# Patient Record
Sex: FEMALE | Race: WHITE | NOT HISPANIC OR LATINO | URBAN - METROPOLITAN AREA
[De-identification: names, ages, dates, MRNs, and addresses within clinical notes are randomized per-mention and may not be internally consistent; named-entity substitution may affect disease eponyms.]

---

## 2018-01-08 ENCOUNTER — EMERGENCY (EMERGENCY)
Facility: HOSPITAL | Age: 83
LOS: 1 days | Discharge: OTHER INSTITUTION | End: 2018-01-08
Attending: EMERGENCY MEDICINE | Admitting: EMERGENCY MEDICINE
Payer: SELF-PAY

## 2018-01-08 ENCOUNTER — TRANSCRIPTION ENCOUNTER (OUTPATIENT)
Age: 83
End: 2018-01-08

## 2018-01-08 ENCOUNTER — INPATIENT (INPATIENT)
Facility: HOSPITAL | Age: 83
LOS: 2 days | Discharge: HOME | DRG: 65 | End: 2018-01-11
Attending: SPECIALIST | Admitting: SPECIALIST
Payer: MEDICARE

## 2018-01-08 VITALS
HEART RATE: 70 BPM | OXYGEN SATURATION: 96 % | DIASTOLIC BLOOD PRESSURE: 85 MMHG | SYSTOLIC BLOOD PRESSURE: 184 MMHG | RESPIRATION RATE: 16 BRPM | WEIGHT: 104.72 LBS

## 2018-01-08 VITALS
SYSTOLIC BLOOD PRESSURE: 155 MMHG | OXYGEN SATURATION: 98 % | HEART RATE: 80 BPM | RESPIRATION RATE: 16 BRPM | DIASTOLIC BLOOD PRESSURE: 78 MMHG

## 2018-01-08 VITALS
DIASTOLIC BLOOD PRESSURE: 90 MMHG | RESPIRATION RATE: 18 BRPM | SYSTOLIC BLOOD PRESSURE: 164 MMHG | HEART RATE: 70 BPM | TEMPERATURE: 98 F | OXYGEN SATURATION: 99 %

## 2018-01-08 DIAGNOSIS — I63.512 CEREBRAL INFARCTION DUE TO UNSPECIFIED OCCLUSION OR STENOSIS OF LEFT MIDDLE CEREBRAL ARTERY: ICD-10-CM

## 2018-01-08 LAB
ALBUMIN SERPL ELPH-MCNC: 4.2 G/DL — SIGNIFICANT CHANGE UP (ref 3.3–5.2)
ALP SERPL-CCNC: 116 U/L — SIGNIFICANT CHANGE UP (ref 40–120)
ALT FLD-CCNC: 12 U/L — SIGNIFICANT CHANGE UP
ANION GAP SERPL CALC-SCNC: 14 MMOL/L — SIGNIFICANT CHANGE UP (ref 5–17)
ANION GAP SERPL CALC-SCNC: 15 MMOL/L — SIGNIFICANT CHANGE UP (ref 5–17)
APTT BLD: 21.1 SEC — LOW (ref 27.5–37.4)
APTT BLD: 26.2 SEC — LOW (ref 27.5–37.4)
AST SERPL-CCNC: 27 U/L — SIGNIFICANT CHANGE UP
BASOPHILS # BLD AUTO: 0 K/UL — SIGNIFICANT CHANGE UP (ref 0–0.2)
BASOPHILS NFR BLD AUTO: 0.2 % — SIGNIFICANT CHANGE UP (ref 0–2)
BILIRUB SERPL-MCNC: 0.6 MG/DL — SIGNIFICANT CHANGE UP (ref 0.4–2)
BLD GP AB SCN SERPL QL: NEGATIVE — SIGNIFICANT CHANGE UP
BLD GP AB SCN SERPL QL: SIGNIFICANT CHANGE UP
BUN SERPL-MCNC: 22 MG/DL — SIGNIFICANT CHANGE UP (ref 7–23)
BUN SERPL-MCNC: 25 MG/DL — HIGH (ref 8–20)
CALCIUM SERPL-MCNC: 10.1 MG/DL — SIGNIFICANT CHANGE UP (ref 8.6–10.2)
CALCIUM SERPL-MCNC: 9.6 MG/DL — SIGNIFICANT CHANGE UP (ref 8.4–10.5)
CHLORIDE SERPL-SCNC: 102 MMOL/L — SIGNIFICANT CHANGE UP (ref 98–107)
CHLORIDE SERPL-SCNC: 99 MMOL/L — SIGNIFICANT CHANGE UP (ref 96–108)
CO2 SERPL-SCNC: 23 MMOL/L — SIGNIFICANT CHANGE UP (ref 22–31)
CO2 SERPL-SCNC: 25 MMOL/L — SIGNIFICANT CHANGE UP (ref 22–29)
CREAT SERPL-MCNC: 0.95 MG/DL — SIGNIFICANT CHANGE UP (ref 0.5–1.3)
CREAT SERPL-MCNC: 0.98 MG/DL — SIGNIFICANT CHANGE UP (ref 0.5–1.3)
EOSINOPHIL # BLD AUTO: 0.1 K/UL — SIGNIFICANT CHANGE UP (ref 0–0.5)
EOSINOPHIL NFR BLD AUTO: 1 % — SIGNIFICANT CHANGE UP (ref 0–6)
GLUCOSE SERPL-MCNC: 123 MG/DL — HIGH (ref 70–99)
GLUCOSE SERPL-MCNC: 153 MG/DL — HIGH (ref 70–115)
HBA1C BLD-MCNC: 5.7 % — HIGH (ref 4–5.6)
HCT VFR BLD CALC: 34.1 % — LOW (ref 34.5–45)
HCT VFR BLD CALC: 35.4 % — LOW (ref 37–47)
HGB BLD-MCNC: 11.5 G/DL — LOW (ref 12–16)
HGB BLD-MCNC: 11.6 G/DL — SIGNIFICANT CHANGE UP (ref 11.5–15.5)
INR BLD: 1.01 RATIO — SIGNIFICANT CHANGE UP (ref 0.88–1.16)
INR BLD: 1.07 RATIO — SIGNIFICANT CHANGE UP (ref 0.88–1.16)
LYMPHOCYTES # BLD AUTO: 4.3 K/UL — SIGNIFICANT CHANGE UP (ref 1–4.8)
LYMPHOCYTES # BLD AUTO: 45.9 % — SIGNIFICANT CHANGE UP (ref 20–55)
MCHC RBC-ENTMCNC: 29.5 PG — SIGNIFICANT CHANGE UP (ref 27–31)
MCHC RBC-ENTMCNC: 31.4 PG — SIGNIFICANT CHANGE UP (ref 27–34)
MCHC RBC-ENTMCNC: 32.5 G/DL — SIGNIFICANT CHANGE UP (ref 32–36)
MCHC RBC-ENTMCNC: 34.1 GM/DL — SIGNIFICANT CHANGE UP (ref 32–36)
MCV RBC AUTO: 90.8 FL — SIGNIFICANT CHANGE UP (ref 81–99)
MCV RBC AUTO: 92.3 FL — SIGNIFICANT CHANGE UP (ref 80–100)
MONOCYTES # BLD AUTO: 0.6 K/UL — SIGNIFICANT CHANGE UP (ref 0–0.8)
MONOCYTES NFR BLD AUTO: 6.2 % — SIGNIFICANT CHANGE UP (ref 3–10)
NEUTROPHILS # BLD AUTO: 4.3 K/UL — SIGNIFICANT CHANGE UP (ref 1.8–8)
NEUTROPHILS NFR BLD AUTO: 46.3 % — SIGNIFICANT CHANGE UP (ref 37–73)
PLATELET # BLD AUTO: 265 K/UL — SIGNIFICANT CHANGE UP (ref 150–400)
PLATELET # BLD AUTO: 284 K/UL — SIGNIFICANT CHANGE UP (ref 150–400)
POTASSIUM SERPL-MCNC: 4.2 MMOL/L — SIGNIFICANT CHANGE UP (ref 3.5–5.3)
POTASSIUM SERPL-MCNC: 4.4 MMOL/L — SIGNIFICANT CHANGE UP (ref 3.5–5.3)
POTASSIUM SERPL-SCNC: 4.2 MMOL/L — SIGNIFICANT CHANGE UP (ref 3.5–5.3)
POTASSIUM SERPL-SCNC: 4.4 MMOL/L — SIGNIFICANT CHANGE UP (ref 3.5–5.3)
PROT SERPL-MCNC: 8.1 G/DL — SIGNIFICANT CHANGE UP (ref 6.6–8.7)
PROTHROM AB SERPL-ACNC: 11.1 SEC — SIGNIFICANT CHANGE UP (ref 9.8–12.7)
PROTHROM AB SERPL-ACNC: 11.7 SEC — SIGNIFICANT CHANGE UP (ref 9.8–12.7)
RBC # BLD: 3.69 M/UL — LOW (ref 3.8–5.2)
RBC # BLD: 3.9 M/UL — LOW (ref 4.4–5.2)
RBC # FLD: 13.3 % — SIGNIFICANT CHANGE UP (ref 10.3–14.5)
RBC # FLD: 14.5 % — SIGNIFICANT CHANGE UP (ref 11–15.6)
RH IG SCN BLD-IMP: POSITIVE — SIGNIFICANT CHANGE UP
SODIUM SERPL-SCNC: 136 MMOL/L — SIGNIFICANT CHANGE UP (ref 135–145)
SODIUM SERPL-SCNC: 142 MMOL/L — SIGNIFICANT CHANGE UP (ref 135–145)
TROPONIN T SERPL-MCNC: <0.01 NG/ML — SIGNIFICANT CHANGE UP (ref 0–0.06)
TYPE + AB SCN PNL BLD: SIGNIFICANT CHANGE UP
WBC # BLD: 18.1 K/UL — HIGH (ref 3.8–10.5)
WBC # BLD: 9.3 K/UL — SIGNIFICANT CHANGE UP (ref 4.8–10.8)
WBC # FLD AUTO: 18.1 K/UL — HIGH (ref 3.8–10.5)
WBC # FLD AUTO: 9.3 K/UL — SIGNIFICANT CHANGE UP (ref 4.8–10.8)

## 2018-01-08 PROCEDURE — 86850 RBC ANTIBODY SCREEN: CPT

## 2018-01-08 PROCEDURE — 70498 CT ANGIOGRAPHY NECK: CPT | Mod: 26

## 2018-01-08 PROCEDURE — 99285 EMERGENCY DEPT VISIT HI MDM: CPT | Mod: 25

## 2018-01-08 PROCEDURE — 70450 CT HEAD/BRAIN W/O DYE: CPT | Mod: 26,59

## 2018-01-08 PROCEDURE — 84484 ASSAY OF TROPONIN QUANT: CPT

## 2018-01-08 PROCEDURE — 86900 BLOOD TYPING SEROLOGIC ABO: CPT

## 2018-01-08 PROCEDURE — 82962 GLUCOSE BLOOD TEST: CPT

## 2018-01-08 PROCEDURE — 36415 COLL VENOUS BLD VENIPUNCTURE: CPT

## 2018-01-08 PROCEDURE — 93010 ELECTROCARDIOGRAM REPORT: CPT

## 2018-01-08 PROCEDURE — 99291 CRITICAL CARE FIRST HOUR: CPT

## 2018-01-08 PROCEDURE — 72170 X-RAY EXAM OF PELVIS: CPT | Mod: 26

## 2018-01-08 PROCEDURE — 72170 X-RAY EXAM OF PELVIS: CPT

## 2018-01-08 PROCEDURE — 85610 PROTHROMBIN TIME: CPT

## 2018-01-08 PROCEDURE — 72125 CT NECK SPINE W/O DYE: CPT

## 2018-01-08 PROCEDURE — 71045 X-RAY EXAM CHEST 1 VIEW: CPT | Mod: 26

## 2018-01-08 PROCEDURE — 99291 CRITICAL CARE FIRST HOUR: CPT | Mod: 25

## 2018-01-08 PROCEDURE — 71045 X-RAY EXAM CHEST 1 VIEW: CPT

## 2018-01-08 PROCEDURE — 80053 COMPREHEN METABOLIC PANEL: CPT

## 2018-01-08 PROCEDURE — 70450 CT HEAD/BRAIN W/O DYE: CPT | Mod: 26

## 2018-01-08 PROCEDURE — 70496 CT ANGIOGRAPHY HEAD: CPT | Mod: 26

## 2018-01-08 PROCEDURE — 86901 BLOOD TYPING SEROLOGIC RH(D): CPT

## 2018-01-08 PROCEDURE — 70450 CT HEAD/BRAIN W/O DYE: CPT

## 2018-01-08 PROCEDURE — 85027 COMPLETE CBC AUTOMATED: CPT

## 2018-01-08 PROCEDURE — G0426: CPT

## 2018-01-08 PROCEDURE — 96374 THER/PROPH/DIAG INJ IV PUSH: CPT

## 2018-01-08 PROCEDURE — 96375 TX/PRO/DX INJ NEW DRUG ADDON: CPT

## 2018-01-08 PROCEDURE — 72125 CT NECK SPINE W/O DYE: CPT | Mod: 26

## 2018-01-08 PROCEDURE — 85730 THROMBOPLASTIN TIME PARTIAL: CPT

## 2018-01-08 RX ORDER — NICARDIPINE HYDROCHLORIDE 30 MG/1
5 CAPSULE, EXTENDED RELEASE ORAL
Qty: 40 | Refills: 0 | Status: DISCONTINUED | OUTPATIENT
Start: 2018-01-08 | End: 2018-01-08

## 2018-01-08 RX ORDER — NICARDIPINE HYDROCHLORIDE 30 MG/1
5 CAPSULE, EXTENDED RELEASE ORAL
Qty: 40 | Refills: 0 | Status: DISCONTINUED | OUTPATIENT
Start: 2018-01-08 | End: 2018-01-12

## 2018-01-08 RX ORDER — HYDRALAZINE HCL 50 MG
10 TABLET ORAL ONCE
Qty: 0 | Refills: 0 | Status: COMPLETED | OUTPATIENT
Start: 2018-01-08 | End: 2018-01-08

## 2018-01-08 RX ORDER — ALTEPLASE 100 MG
4.3 KIT INTRAVENOUS ONCE
Qty: 0 | Refills: 0 | Status: COMPLETED | OUTPATIENT
Start: 2018-01-08 | End: 2018-01-08

## 2018-01-08 RX ORDER — ATENOLOL 25 MG/1
0 TABLET ORAL
Qty: 0 | Refills: 0 | COMMUNITY

## 2018-01-08 RX ORDER — SODIUM CHLORIDE 9 MG/ML
500 INJECTION INTRAMUSCULAR; INTRAVENOUS; SUBCUTANEOUS ONCE
Qty: 0 | Refills: 0 | Status: COMPLETED | OUTPATIENT
Start: 2018-01-08 | End: 2018-01-08

## 2018-01-08 RX ORDER — SERTRALINE 25 MG/1
0 TABLET, FILM COATED ORAL
Qty: 0 | Refills: 0 | COMMUNITY

## 2018-01-08 RX ORDER — ATORVASTATIN CALCIUM 80 MG/1
0 TABLET, FILM COATED ORAL
Qty: 0 | Refills: 0 | COMMUNITY

## 2018-01-08 RX ORDER — ALTEPLASE 100 MG
38 KIT INTRAVENOUS ONCE
Qty: 0 | Refills: 0 | Status: COMPLETED | OUTPATIENT
Start: 2018-01-08 | End: 2018-01-08

## 2018-01-08 RX ADMIN — Medication 10 MILLIGRAM(S): at 13:53

## 2018-01-08 RX ADMIN — ALTEPLASE 258 MILLIGRAM(S): KIT at 13:05

## 2018-01-08 RX ADMIN — NICARDIPINE HYDROCHLORIDE 25 MG/HR: 30 CAPSULE, EXTENDED RELEASE ORAL at 13:53

## 2018-01-08 RX ADMIN — ALTEPLASE 38 MILLIGRAM(S): KIT at 13:08

## 2018-01-08 NOTE — DISCHARGE NOTE ADULT - CARE PROVIDER_API CALL
Robbie Theodore (DO), Neurology; Vascular Neurology  [address] [facility] [address] [address]  [place] [address]  Phone: [phone]  Fax: [phone]

## 2018-01-08 NOTE — ED PROVIDER NOTE - MEDICAL DECISION MAKING DETAILS
Pt found down, neck tenderness no other obvious trauma. High grade stroke symptoms. CT/CTA, code stroke called. No contraindications to TPA if CT head clear.

## 2018-01-08 NOTE — ED PROVIDER NOTE - PROGRESS NOTE DETAILS
High grade code stroke. CTA felt to be emergently necessary and will be performed before labs. No CTA from lack of access. Assessed bys [facility] neurologist. While some improvement, they are still recommending TPA, CTA. TPA ordered Dr. Alonso of telestroke recommending Xfer to [facility] for stroke rescue. D/w Dr. Parker of [facility] ED. Pt stable. NO obvious trauma on XR, ?slight aspiration. Pt put on nasal canula, protecting airway currently, do not think needs intubation prior to transfer.

## 2018-01-08 NOTE — H&P ADULT - NSHPPHYSICALEXAM_GEN_ALL_CORE
Neuro: AAOx2; can not say age, month, obeys simple commands, mild dysarthria; names simple objects difficulty with complex objects, repeats     CN: PERRL, EOMI, ? right visual field cut normal gaze preference, no facial palsy,     Motor: no drift in all extremities    Sensory: Intact to LT and PP no extinction    Coordination: FTN intact b/l

## 2018-01-08 NOTE — STROKE CODE NOTE - SUBJECTIVE
Millie is an 86-year-old woman who was last seen normal at approximately 200 7 PM today, her son was with her she had lunch and was in her normal state at that time. She was then found down  on the ground. On neurological exam she was aphasic had a right nasolabial facial asymmetry and right arm and leg lift on my exam on telestroke.

## 2018-01-08 NOTE — DISCHARGE NOTE ADULT - MEDICATION SUMMARY - MEDICATIONS TO TAKE
I will START or STAY ON the medications listed below when I get home from the hospital:    aspirin 81 mg oral delayed release tablet  -- 1 tab(s) by mouth once a day  -- Indication: For stroke    sertraline 50 mg oral tablet  -- 1.5 tab(s) by mouth once a day  -- Indication: For depression    meclizine 12.5 mg oral tablet  -- 1 tab(s) by mouth 2 times a day, As Needed - for dizziness  -- Indication: For dizziness    atorvastatin 20 mg oral tablet  -- 1 tab(s) by mouth once a day  -- Indication: For Hyperlipidemia    atenolol 25 mg oral tablet  -- 1 tab(s) by mouth once a day  -- Indication: For Hypertension    hydroCHLOROthiazide 12.5 mg oral capsule  -- 1 cap(s) by mouth once a day  -- Indication: For Hypertension    sulfamethoxazole-trimethoprim 800 mg-160 mg oral tablet  -- 1 tab(s) by mouth 2 times a day for total of 7 days. Stop Date (1/17)  -- Indication: For UTI I will START or STAY ON the medications listed below when I get home from the hospital:    Rolling walker   -- Rolling Walker   -- Indication: For Activity    Aspirin Enteric Coated 81 mg oral delayed release tablet  -- 1 tab(s) by mouth once a day   -- Swallow whole.  Do not crush.  Take with food or milk.    -- Indication: For Cerebrovascular accident (CVA) due to occlusion of left middle cerebral artery    sertraline 50 mg oral tablet  -- 1.5 tab(s) by mouth once a day  -- Indication: For depression    meclizine 12.5 mg oral tablet  -- 1 tab(s) by mouth 2 times a day, As Needed - for dizziness  -- Indication: For dizziness    atorvastatin 20 mg oral tablet  -- 1 tab(s) by mouth once a day (at bedtime)   -- Avoid grapefruit and grapefruit juice while taking this medication.  Do not take this drug if you are pregnant.  It is very important that you take or use this exactly as directed.  Do not skip doses or discontinue unless directed by your doctor.  Obtain medical advice before taking any non-prescription drugs as some may affect the action of this medication.  Take with food or milk.    -- Indication: For Cerebral infarction due to occlusion or stenosis of left middle cerebral artery    atenolol 25 mg oral tablet  -- 1 tab(s) by mouth once a day  -- Indication: For Hypertension    hydroCHLOROthiazide 12.5 mg oral capsule  -- 1 cap(s) by mouth once a day  -- Indication: For Hypertension    sulfamethoxazole-trimethoprim 800 mg-160 mg oral tablet  -- 1 tab(s) by mouth 2 times a day, last dose 1/17/18  -- Avoid prolonged or excessive exposure to direct and/or artificial sunlight while taking this medication.  Finish all this medication unless otherwise directed by prescriber.  Medication should be taken with plenty of water.    -- Indication: For UTI I will START or STAY ON the medications listed below when I get home from the hospital:    Rolling walker   -- Rolling Walker   -- Indication: For Activity    Aspirin Enteric Coated 81 mg oral delayed release tablet  -- 1 tab(s) by mouth once a day   -- Swallow whole.  Do not crush.  Take with food or milk.    -- Indication: For Cerebrovascular accident (CVA) due to occlusion of left middle cerebral artery    sertraline 50 mg oral tablet  -- 1.5 tab(s) by mouth once a day  -- Indication: For depression    meclizine 12.5 mg oral tablet  -- 1 tab(s) by mouth 2 times a day, As Needed - for dizziness  -- Indication: For dizziness    atorvastatin 20 mg oral tablet  -- 1 tab(s) by mouth once a day (at bedtime)   -- Avoid grapefruit and grapefruit juice while taking this medication.  Do not take this drug if you are pregnant.  It is very important that you take or use this exactly as directed.  Do not skip doses or discontinue unless directed by your doctor.  Obtain medical advice before taking any non-prescription drugs as some may affect the action of this medication.  Take with food or milk.    -- Indication: For Cerebral infarction due to occlusion or stenosis of left middle cerebral artery    atenolol 25 mg oral tablet  -- 1 tab(s) by mouth once a day  -- Indication: For Hypertension    hydroCHLOROthiazide 12.5 mg oral capsule  -- 1 cap(s) by mouth once a day  -- Indication: For Hypertension

## 2018-01-08 NOTE — ED ADULT NURSE NOTE - OBJECTIVE STATEMENT
72 yo female with PMH dementia, transfer from [facility] s/p TPA infusion for right sided weakness and facial droop. Per [facility] sunris chart, patient was out to lunch with her son at 1200, patient went to bathroom between 12:05 and 12:07, shortly thereafter patient was found on ground. On arrival to [facility] patient noted to have right sided weakness and facial droop. Upon arrival to ED, EMS stated that TPA infusion ended at 1400. Patient A&O x2 (oriented to person and place). Patient moving all extremities strong. Face is symmetrical. PERRL 3mmB. Respirations unlabored. Abdomen is soft, nondistended, nontender to palpation. 2 peripheral IV in place. +Skin tear with some visible subcutaneous tissue noted to right posterior forearm, dressing changed, pressure applied. 74 yo female with PMH dementia, transfer from [facility] s/p TPA infusion for right sided weakness and facial droop. Per [facility] sunris chart, patient was out to lunch with her son at 1200, patient went to bathroom between 12:05 and 12:07, shortly thereafter patient was found on ground. On arrival to [facility] patient noted to have right sided weakness and facial droop. Upon arrival to ED, EMS stated that TPA infusion ended at 1400. Patient A&O x2 (oriented to person and place). Patient moving all extremities strong. Face is symmetrical. L pupil 4MM reactive, R [id number] reactive Respirations unlabored. Abdomen is soft, nondistended, nontender to palpation. 2 peripheral IV in place. +Skin tear with some visible subcutaneous tissue noted to right posterior forearm, dressing changed, pressure applied. 74 yo female with PMH dementia, transfer from [facility] s/p TPA infusion for right sided weakness and facial droop. Per [facility] sunrise chart, patient was out to lunch with her son at 1200, patient went to bathroom between 12:05 and 12:07, shortly thereafter patient was found on ground. On arrival to [facility] patient noted to have right sided weakness and facial droop. Upon arrival to ED, EMS stated that TPA infusion ended at 1400. Patient A&O x2 (oriented to person and place). Patient moving all extremities strong. Face is symmetrical. L pupil 4MM reactive, R [id number] reactive Respirations unlabored. Abdomen is soft, nondistended, nontender to palpation. 2 peripheral IV in place. +Skin tear with some visible subcutaneous tissue noted to right posterior forearm, dressing changed, pressure applied. SEE NEURO FLOWSHEET FOR VITAL SIGNS AND REPEAT NEURO ASSESSMENTS

## 2018-01-08 NOTE — ED ADULT NURSE NOTE - OBJECTIVE STATEMENT
pt was in house with son  son went outside for a minute came back in and pts was on the floor not responding   pt arrived, CCOLLAR Placed.

## 2018-01-08 NOTE — H&P ADULT - ASSESSMENT
86 RHF unknown PMH presented as a possible endovascular, s/p TPA for reported unresponsiveness, aphasia and right hemiplegia at [place]. Patient improving. PE mild dysarthria, difficulty with complex naming, couldn't say age or month. CTH- CTAmild right vert and bilateral ICA stenosis. TPA at 13:02. NIHSS 5, pre MRS 2 ([place] [id number])    goal BP < 180/110  Neuro checks q2hr in Stroke Unit  NPO x 24hrs  Dysphagia screen in 12-24hrs    >Imaging/Studies    MRI brain w/o cont  Repeat Head CT or MRI in 24hrs evaluate for hemorrhagic conversion or get CTH earlier for change in mental status  TTE  Telemetry monitoring     >Labs  HgA1c  Lipid profile      >Meds  ASA 81mg after 24hrs if no significant bleeding on repeat imaging  Lipitor 80mg qhs      PT/ OT  Speech and Swallow Evaluation  would try to obtain more info from family upon arrival 86 RHF unknown PMH presented as a possible endovascular, s/p TPA for reported unresponsiveness, aphasia and right hemiplegia at [place]. Patient improving. PE ? right visual cut which improved on reexamination mild dysarthria (improving), difficulty with complex naming, couldn't say age or month. CTH- CTA mild right vert and bilateral ICA stenosis. TPA at 13:02. NIHSS 5, pre MRS 2 ([place] [id number])     goal BP < 180/110  Neuro checks q2hr in Stroke Unit  NPO x 24hrs  Dysphagia screen in 12-24hrs    >Imaging/Studies    MRI brain w/o cont  Repeat Head CT or MRI in 24hrs evaluate for hemorrhagic conversion or get CTH earlier for change in mental status  TTE  Telemetry monitoring   If MRI is negative would get EEG    >Labs  HgA1c  Lipid profile      >Meds  ASA 81mg after 24hrs if no significant bleeding on repeat imaging  Lipitor 80mg qhs      PT/ OT  Speech and Swallow Evaluation  would try to obtain more info from family upon arrival

## 2018-01-08 NOTE — DISCHARGE NOTE ADULT - OTHER SIGNIFICANT FINDINGS
CTA HEAD:    Atherosclerotic calcifications and mild stenosis involves the   intracranial segment of the right vertebral artery. Atherosclerotic   calcifications and mild stenoses involve the cavernous segments of both   internal carotid arteries. Otherwise, there is no evidence for focal   stenosis or major vessel occlusion about the [place].    There is no gross evidence for aneurysm. Tiny aneurysms can be beyond the   resolution of CTA imaging technique.

## 2018-01-08 NOTE — ED PROVIDER NOTE - OBJECTIVE STATEMENT
72yo F stroke rescue from [facility] s/p TPA, normal during lunch with son, who walked away for a few minutes and returned to find her on the floor, +trouble speaking +rt sided weakness. patient placed in c-collar @ [facility] since found down. son not here, history from [facility] chart. Pt currently with expressive aphasia, following commands. does not recall being at other hospital or what happened today

## 2018-01-08 NOTE — H&P ADULT - NSHPLABSRESULTS_GEN_ALL_CORE
< from: CT Brain Stroke Protocol (01.08.18 @ 15:12) >    No CT evidence for acute infarct or acute hemorrhage. If the patient has   new and persistent symptoms, consider short interval follow-up head CT or   brain MRI follow-up if there are no MRI contraindications.      < end of copied text >    < from: CT Angio Neck w/ IV Cont (01.08.18 @ 15:11) >    Atherosclerotic calcifications and mild stenosis involves the   intracranial segment of the right vertebral artery. Atherosclerotic   calcifications and mild stenoses involve the cavernous segments of both   internal carotid arteries. Otherwise, there is no evidence for focal   stenosis or major vessel occlusion about the [place].    There is no gross evidence for aneurysm. Tiny aneurysms can be beyond the   resolution of CTA imaging technique.    IMPRESSION:    No evidence for significant intracranial or extracranial arterial   stenosis.    < end of copied text >

## 2018-01-08 NOTE — DISCHARGE NOTE ADULT - PLAN OF CARE
prevent recurrence Resume regular diet as tolerated.   Continue all medications as directed below.  Please follow up with you PMD in 1-2 weeks.  Please follow up with vascular neurology. Please follow up with your primary medical doctor within one to two weeks of discharge from the hospital.  Please follow up with your stroke neurologist within one to two weeks of discharge from the hospital.  Please follow up with your cardiologist within one to two weeks of discharge from the hospital. Please discuss with them your need for an echocardiogram and a 30 day Holter Monitor.  Please continue to take your medications as prescribed by your doctor.  Please continue to participate in your rehab.

## 2018-01-08 NOTE — H&P ADULT - ATTENDING COMMENTS
VASCULAR NEUROLOGY ATTENDING  The patient is seen and examined the history and imaging are reviewed. I agree with the resident note unless otherwise noted. Patient with acute LMCA syndrome as above treated with IVtpa per protocol. Marked clinical improvement this AM R drift alone. Suspect aborted stroke. Await MRI/A head and neck TTE possible ILR ASA statin. PT/OT/SS evals

## 2018-01-08 NOTE — ED PROVIDER NOTE - MEDICAL DECISION MAKING DETAILS
stroke rescue, neuro exam improving compared to note at [facility], minimal rt UE strength, aphasia improving on exam, still slightly confused unable to clear colar, CT c spine negative but will leave collar on for now. repeat labs- draw off line. CT/CTA, neuro TBA.

## 2018-01-08 NOTE — ED PROVIDER NOTE - ATTENDING CONTRIBUTION TO CARE
****ATTENDING**** 72yo female tx from [facility] as a stroke rescue. Patient was having lunch w son when found on floor w aphasia and R sided weakness. Pt given tpa at [facility] and on cardene drip for BP control. On arrival to the ED, pts BP stable off cardene. Patient is apashic w intermittent improvement in R sided weakness. On exam, dry crusted blood in mouth, no laceration noted. c-collar in place. chest cta b/l, +S1S2, Abd soft nd/nt +BS. Ext no edema. RUE 3/5, RLE 3/5.   Code stroke called on arrival. BP remains stable, continue to monitor closely. Patient admitted to stroke service.

## 2018-01-08 NOTE — ED ADULT TRIAGE NOTE - CHIEF COMPLAINT QUOTE
pt biba from home, as per ems pt last known well was roughly 30 minutes ago. as per ems, son states that he left room and patient was fine and came back and she was found on floor altered. not on anticoagulants. facial droop noted, skin tear to right arm, pt not speaking, code stroke initiated by dr cardenas @ [id number]

## 2018-01-08 NOTE — H&P ADULT - HISTORY OF PRESENT ILLNESS
86F BIBEMS for AMS. Pt was last normal at approximately 1207 AM. [place] [facility] [place] took a 5 minute phone call and found her down on the ground. No obvious evidence of trauma. Pt nonverbal, responsive only to pain. FSG in the field 140s. EMS noted R-hemiplegia and facial droop. [place] MRN [id number] 86 RHF unknown PMH denies taking daily ASA transferred for stroke rescue. Pt was last normal at approximately 11:52 AM and then found  down on the ground 5 minutes later. No obvious evidence of trauma. Patient Patient presented to [place] where patient reportedly nonverbal, responsive only to pain, aphasic, right sided weakeness. Patient received TPA there at 13:02. Upon arrival patient was improved from prior exam. Patient is a poor historian. Son was not at bedside and phone number not available

## 2018-01-08 NOTE — ED PROVIDER NOTE - PHYSICAL EXAMINATION
Gen: NAD, AOx1, c-collar in place  Head: NCAT  HEENT: PERRL, oral mucosa dry, normal conjunctiva, +rt sided neglect, +dry blood left side of mouth no active bleeding  Lung: CTAB, no respiratory distress  CV: rrr, no murmur, Normal perfusion  Abd: soft, NTND  MSK: No edema, no visible deformities  Neuro: +expressive aphasia, mild rt UE weakness, no LE drift   Skin: No rash

## 2018-01-08 NOTE — STROKE CODE NOTE - OBJECTIVE
Plan:   - Risks, benefits and adverse reactions associated with IV tPA including hemorrhagic stroke were discussed wit in detail. After ruling out contraindications and obtaining verbal consent, patient would be treated with IV tPA  - Cont with IV tPA precautions including BP goal<185/110 mmHg before the bolus  Transfer to [facility] for evaluation for CT angiogram of head and neck, possible neuro-interventional surgery if imaging shows large vessel occlusion  - Allow permissive HTN up to 180/105 mmHg   - Cont with IV hydration  Above mentioned plan was discussed with patient, available family member and Dr. Phi LALA MD in detail. All the questions were answered and concerns were addressed.

## 2018-01-08 NOTE — ED ADULT NURSE NOTE - CHIEF COMPLAINT QUOTE
pt biba from home, as per ems pt last known well was roughly 30 minutes ago. as per ems, son states that he left room and patient was fine and came back and she was found on floor altered. not on anticoagulants. facial droop noted, skin tear to right arm, pt not speaking, code stroke initiated by dr cardenas @ [id number]

## 2018-01-08 NOTE — DISCHARGE NOTE ADULT - HOME CARE AGENCY
[facility] [phone] for VN and PT. VISITING NURSE WILL CALL TO SCHEDULE VIST DAY AFTER DISCHARGE.

## 2018-01-08 NOTE — ED ADULT NURSE REASSESSMENT NOTE - NS ED NURSE REASSESS COMMENT FT1
pt was in house with son, son went outside for a minute ret'd and pt was on the floor   pt was not responding to verbal stimuli   blood sugar 143 per EMS
pt in CT scan
pt treated for stroke, as per MD Yip and MD Almazan from eICU transfer to [place] for risk of large stroke, refer to flowsheet and chart, pt hemodynamically stable of alteplase gtt and cardene gtt, report given to MD and RN via transfer center, pt en route to [facility]

## 2018-01-08 NOTE — DISCHARGE NOTE ADULT - HOSPITAL COURSE
86 RHF unknown PMH denies taking daily ASA transferred for stroke rescue. Pt was last normal at approximately 11:52 AM and then found down on the ground 5 minutes later. No obvious evidence of trauma. Patient presented to [place] where patient reportedly nonverbal, responsive only to pain, aphasic, right sided weakness. Patient received TPA there at 13:02. Upon arrival patient was improved from prior exam. Patient is a poor historian. Son was not at bedside and phone number not available at that time.    Initial exam significant for : AAOx2; can not say age, month, obeys simple commands, mild dysarthria; names simple objects difficulty with complex objects, repeats. Right visual field cut, no gaze preference, no facial palsy. No drift.   CT Head on presentation was negative. CTA head/neck completed was negative.    NIHSS 5  MRS 2  Dysphagia screen passed, pt was pureed diet and advanced as tolerated.                                                                                                                                                                                                                                                                                         Pt was admitted to the Stroke Service for further evaluation.     Hospital Course:   Pt was started on Asa 81mg and Atorvastatin 80mg after 24h post tPA and stable Rp Head CT.   MRI Brain w/o contrast demonstrated no evidence of acute infarct.  MRA Head/Neck found  A1c was 5.7. LDL 99 in hospital, Atorvastatin lowered to 40mg.   TTE was notable for ___________.      Pt likely had aborted stroke s/p tPA. Etiology of pt’s CVA is likely small vessel disease 2/2 HTN although cardioembolism cannot be ruled out. Pt will get 30d event monitor.   Course c/b UTI - started on Bactrim 1/9/18 160BID x7days.  Pt was seen and evaluated by OT/PT, who recommended _______________.    Pt is currently medically stable for discharge. Plan discussed in detail with pt at bedside.     Discharge Plan:  Please continue all meds as listed above. ASA 81mg daily, Atorvastatin 40mg qhs, Bactrim 160mg BID x7 days to stop 1/17/18.    Follow-up:   Vascular Neurology, Dr. Theodore  PMD in 1-2 weeks. 86 RHF unknown PMH denies taking daily ASA transferred for stroke rescue. Pt was last normal at approximately 11:52 AM and then found down on the ground 5 minutes later. No obvious evidence of trauma. Patient presented to [place] where patient reportedly nonverbal, responsive only to pain, aphasic, right sided weakness. Patient received TPA there at 13:02. Upon arrival patient was improved from prior exam. Patient is a poor historian. Son was not at bedside and phone number not available at that time.    Initial exam significant for : AAOx2; can not say age, month, obeys simple commands, mild dysarthria; names simple objects difficulty with complex objects, repeats. Right visual field cut, no gaze preference, no facial palsy. No drift.   CT Head on presentation was negative. CTA head/neck completed was negative.    NIHSS 5  MRS 2  Dysphagia screen passed, pt was pureed diet and advanced as tolerated.                                                                                                                                                                                                                                                                                         Pt was admitted to the Stroke Service for further evaluation.     Hospital Course:   Pt was started on Asa 81mg and Atorvastatin 80mg after 24h post tPA and stable Rp Head CT.   MRI Brain w/o contrast demonstrated no evidence of acute infarct.  MRA Head/Neck found  A1c was 5.7. LDL 99 in hospital, Atorvastatin lowered to 40mg.   TTE and Holter Monitor as outpatient.      Pt likely had aborted stroke s/p tPA. Etiology of pt’s CVA is likely small vessel disease 2/2 HTN although cardioembolism cannot be ruled out. Pt will get 30d event monitor.   Course c/b UTI - started on Bactrim 1/9/18 160BID x7days.  Pt was seen and evaluated by OT/PT, who recommended [facility] however pt wants to go home with (home care will be set up, 2 sons plan on taking shifts caring for her [confirmed by son Bernardo]).    Pt is currently medically stable for discharge. Plan discussed in detail with pt at bedside.     Discharge Plan:  Please continue all meds as listed above. ASA 81mg daily, Atorvastatin 40mg qhs, Bactrim 160mg BID x7 days to stop 1/17/18.    Follow-up:   Vascular Neurology, Dr. Theodore  Cardiology  PMD in 1-2 weeks.

## 2018-01-08 NOTE — ED PROVIDER NOTE - CRITICAL CARE PROVIDED
additional history taking/direct patient care (not related to procedure)/interpretation of diagnostic studies/consultation with other physicians/documentation/consult w/ pt's family directly relating to pts condition

## 2018-01-08 NOTE — DISCHARGE NOTE ADULT - CARE PLAN
Principal Discharge DX:	Cerebrovascular accident (CVA) due to occlusion of left middle cerebral artery  Goal:	prevent recurrence  Instructions for follow-up, activity and diet:	Resume regular diet as tolerated.   Continue all medications as directed below.  Please follow up with you PMD in 1-2 weeks.  Please follow up with vascular neurology. Principal Discharge DX:	Cerebrovascular accident (CVA) due to occlusion of left middle cerebral artery  Goal:	prevent recurrence  Instructions for follow-up, activity and diet:	Please follow up with your primary medical doctor within one to two weeks of discharge from the hospital.  Please follow up with your stroke neurologist within one to two weeks of discharge from the hospital.  Please follow up with your cardiologist within one to two weeks of discharge from the hospital. Please discuss with them your need for an echocardiogram and a 30 day Holter Monitor.  Please continue to take your medications as prescribed by your doctor.  Please continue to participate in your rehab.

## 2018-01-08 NOTE — ED PROVIDER NOTE - CARE PLAN
Principal Discharge DX:	Cerebrovascular accident (CVA) due to occlusion of left middle cerebral artery  Secondary Diagnosis:	Fall, subsequent encounter

## 2018-01-09 LAB
ANION GAP SERPL CALC-SCNC: 13 MMOL/L — SIGNIFICANT CHANGE UP (ref 5–17)
APPEARANCE UR: CLEAR — SIGNIFICANT CHANGE UP
BACTERIA # UR AUTO: NEGATIVE — SIGNIFICANT CHANGE UP
BILIRUB UR-MCNC: NEGATIVE — SIGNIFICANT CHANGE UP
BUN SERPL-MCNC: 20 MG/DL — SIGNIFICANT CHANGE UP (ref 7–23)
CALCIUM SERPL-MCNC: 9.9 MG/DL — SIGNIFICANT CHANGE UP (ref 8.4–10.5)
CHLORIDE SERPL-SCNC: 102 MMOL/L — SIGNIFICANT CHANGE UP (ref 96–108)
CO2 SERPL-SCNC: 24 MMOL/L — SIGNIFICANT CHANGE UP (ref 22–31)
COLOR SPEC: YELLOW — SIGNIFICANT CHANGE UP
CREAT SERPL-MCNC: 1.04 MG/DL — SIGNIFICANT CHANGE UP (ref 0.5–1.3)
DIFF PNL FLD: NEGATIVE — SIGNIFICANT CHANGE UP
EPI CELLS # UR: 1 /HPF — SIGNIFICANT CHANGE UP (ref 0–5)
GLUCOSE SERPL-MCNC: 106 MG/DL — HIGH (ref 70–99)
GLUCOSE UR QL: NEGATIVE MG/DL — SIGNIFICANT CHANGE UP
HCT VFR BLD CALC: 36.3 % — SIGNIFICANT CHANGE UP (ref 34.5–45)
HGB BLD-MCNC: 11.8 G/DL — SIGNIFICANT CHANGE UP (ref 11.5–15.5)
HYALINE CASTS # UR AUTO: 3 /LPF — SIGNIFICANT CHANGE UP (ref 0–7)
KETONES UR-MCNC: NEGATIVE — SIGNIFICANT CHANGE UP
LEUKOCYTE ESTERASE UR-ACNC: ABNORMAL
MCHC RBC-ENTMCNC: 30.2 PG — SIGNIFICANT CHANGE UP (ref 27–34)
MCHC RBC-ENTMCNC: 32.4 GM/DL — SIGNIFICANT CHANGE UP (ref 32–36)
MCV RBC AUTO: 93.3 FL — SIGNIFICANT CHANGE UP (ref 80–100)
NITRITE UR-MCNC: NEGATIVE — SIGNIFICANT CHANGE UP
PH UR: 5.5 — SIGNIFICANT CHANGE UP (ref 5–8)
PLATELET # BLD AUTO: 254 K/UL — SIGNIFICANT CHANGE UP (ref 150–400)
POTASSIUM SERPL-MCNC: 4.2 MMOL/L — SIGNIFICANT CHANGE UP (ref 3.5–5.3)
POTASSIUM SERPL-SCNC: 4.2 MMOL/L — SIGNIFICANT CHANGE UP (ref 3.5–5.3)
PROT UR-MCNC: ABNORMAL MG/DL
RBC # BLD: 3.89 M/UL — SIGNIFICANT CHANGE UP (ref 3.8–5.2)
RBC # FLD: 13.3 % — SIGNIFICANT CHANGE UP (ref 10.3–14.5)
RBC CASTS # UR COMP ASSIST: 3 /HPF — SIGNIFICANT CHANGE UP (ref 0–4)
SODIUM SERPL-SCNC: 139 MMOL/L — SIGNIFICANT CHANGE UP (ref 135–145)
SP GR SPEC: 1.03 — HIGH (ref 1.01–1.02)
UROBILINOGEN FLD QL: NEGATIVE MG/DL — SIGNIFICANT CHANGE UP
WBC # BLD: 10.9 K/UL — HIGH (ref 3.8–10.5)
WBC # FLD AUTO: 10.9 K/UL — HIGH (ref 3.8–10.5)
WBC UR QL: 53 /HPF — HIGH (ref 0–5)

## 2018-01-09 PROCEDURE — 70551 MRI BRAIN STEM W/O DYE: CPT | Mod: 26

## 2018-01-09 RX ORDER — ENOXAPARIN SODIUM 100 MG/ML
30 INJECTION SUBCUTANEOUS DAILY
Qty: 0 | Refills: 0 | Status: DISCONTINUED | OUTPATIENT
Start: 2018-01-09 | End: 2018-01-11

## 2018-01-09 RX ORDER — ASPIRIN/CALCIUM CARB/MAGNESIUM 324 MG
81 TABLET ORAL DAILY
Qty: 0 | Refills: 0 | Status: DISCONTINUED | OUTPATIENT
Start: 2018-01-09 | End: 2018-01-11

## 2018-01-09 NOTE — PROVIDER CONTACT NOTE (OTHER) - ACTION/TREATMENT ORDERED:
As Per Dr. Sidhu, if there is no change in patient condition since coming to the stroke unit, she is ok with the MRI being done later this evening.

## 2018-01-10 LAB
ANION GAP SERPL CALC-SCNC: 12 MMOL/L — SIGNIFICANT CHANGE UP (ref 5–17)
APPEARANCE UR: CLEAR — SIGNIFICANT CHANGE UP
BILIRUB UR-MCNC: NEGATIVE — SIGNIFICANT CHANGE UP
BUN SERPL-MCNC: 22 MG/DL — SIGNIFICANT CHANGE UP (ref 7–23)
CALCIUM SERPL-MCNC: 9.9 MG/DL — SIGNIFICANT CHANGE UP (ref 8.4–10.5)
CHLORIDE SERPL-SCNC: 103 MMOL/L — SIGNIFICANT CHANGE UP (ref 96–108)
CO2 SERPL-SCNC: 26 MMOL/L — SIGNIFICANT CHANGE UP (ref 22–31)
COLOR SPEC: YELLOW — SIGNIFICANT CHANGE UP
CREAT SERPL-MCNC: 1.07 MG/DL — SIGNIFICANT CHANGE UP (ref 0.5–1.3)
CULTURE RESULTS: SIGNIFICANT CHANGE UP
DIFF PNL FLD: NEGATIVE — SIGNIFICANT CHANGE UP
GLUCOSE SERPL-MCNC: 102 MG/DL — HIGH (ref 70–99)
GLUCOSE UR QL: NEGATIVE MG/DL — SIGNIFICANT CHANGE UP
HCT VFR BLD CALC: 33.8 % — LOW (ref 34.5–45)
HGB BLD-MCNC: 11.6 G/DL — SIGNIFICANT CHANGE UP (ref 11.5–15.5)
KETONES UR-MCNC: NEGATIVE — SIGNIFICANT CHANGE UP
LEUKOCYTE ESTERASE UR-ACNC: NEGATIVE — SIGNIFICANT CHANGE UP
MCHC RBC-ENTMCNC: 31.9 PG — SIGNIFICANT CHANGE UP (ref 27–34)
MCHC RBC-ENTMCNC: 34.3 GM/DL — SIGNIFICANT CHANGE UP (ref 32–36)
MCV RBC AUTO: 93 FL — SIGNIFICANT CHANGE UP (ref 80–100)
NITRITE UR-MCNC: NEGATIVE — SIGNIFICANT CHANGE UP
PH UR: 5 — SIGNIFICANT CHANGE UP (ref 5–8)
PLATELET # BLD AUTO: 238 K/UL — SIGNIFICANT CHANGE UP (ref 150–400)
POTASSIUM SERPL-MCNC: 3.9 MMOL/L — SIGNIFICANT CHANGE UP (ref 3.5–5.3)
POTASSIUM SERPL-SCNC: 3.9 MMOL/L — SIGNIFICANT CHANGE UP (ref 3.5–5.3)
PROT UR-MCNC: NEGATIVE MG/DL — SIGNIFICANT CHANGE UP
RBC # BLD: 3.63 M/UL — LOW (ref 3.8–5.2)
RBC # FLD: 13.4 % — SIGNIFICANT CHANGE UP (ref 10.3–14.5)
SODIUM SERPL-SCNC: 141 MMOL/L — SIGNIFICANT CHANGE UP (ref 135–145)
SP GR SPEC: 1.02 — SIGNIFICANT CHANGE UP (ref 1.01–1.02)
SPECIMEN SOURCE: SIGNIFICANT CHANGE UP
UROBILINOGEN FLD QL: NEGATIVE MG/DL — SIGNIFICANT CHANGE UP
WBC # BLD: 9.3 K/UL — SIGNIFICANT CHANGE UP (ref 3.8–10.5)
WBC # FLD AUTO: 9.3 K/UL — SIGNIFICANT CHANGE UP (ref 3.8–10.5)

## 2018-01-10 RX ORDER — HYDROCHLOROTHIAZIDE 25 MG
12.5 TABLET ORAL DAILY
Qty: 0 | Refills: 0 | Status: DISCONTINUED | OUTPATIENT
Start: 2018-01-10 | End: 2018-01-11

## 2018-01-10 RX ORDER — ATORVASTATIN CALCIUM 80 MG/1
1 TABLET, FILM COATED ORAL
Qty: 30 | Refills: 0 | OUTPATIENT
Start: 2018-01-10 | End: 2018-02-08

## 2018-01-10 RX ORDER — SERTRALINE 25 MG/1
75 TABLET, FILM COATED ORAL DAILY
Qty: 0 | Refills: 0 | Status: DISCONTINUED | OUTPATIENT
Start: 2018-01-10 | End: 2018-01-11

## 2018-01-10 RX ORDER — ATORVASTATIN CALCIUM 80 MG/1
40 TABLET, FILM COATED ORAL AT BEDTIME
Qty: 0 | Refills: 0 | Status: DISCONTINUED | OUTPATIENT
Start: 2018-01-10 | End: 2018-01-11

## 2018-01-10 RX ORDER — ASPIRIN/CALCIUM CARB/MAGNESIUM 324 MG
1 TABLET ORAL
Qty: 30 | Refills: 0 | OUTPATIENT
Start: 2018-01-10 | End: 2018-02-08

## 2018-01-10 RX ORDER — ATENOLOL 25 MG/1
25 TABLET ORAL DAILY
Qty: 0 | Refills: 0 | Status: DISCONTINUED | OUTPATIENT
Start: 2018-01-10 | End: 2018-01-11

## 2018-01-10 RX ADMIN — SERTRALINE 75 MILLIGRAM(S): 25 TABLET, FILM COATED ORAL at 12:26

## 2018-01-10 RX ADMIN — Medication 81 MILLIGRAM(S): at 12:25

## 2018-01-10 RX ADMIN — ATORVASTATIN CALCIUM 40 MILLIGRAM(S): 80 TABLET, FILM COATED ORAL at 21:58

## 2018-01-10 RX ADMIN — Medication 12.5 MILLIGRAM(S): at 12:27

## 2018-01-10 RX ADMIN — Medication 81 MILLIGRAM(S): at 00:02

## 2018-01-10 RX ADMIN — ENOXAPARIN SODIUM 30 MILLIGRAM(S): 100 INJECTION SUBCUTANEOUS at 12:25

## 2018-01-10 NOTE — PROGRESS NOTE ADULT - ASSESSMENT
ASSESSMENT: 86 RHF unknown PMH presented as a possible endovascular, s/p TPA for reported unresponsiveness, aphasia and right hemiplegia at [place]. NIHSS 5, pre MRS 2 Impression: Presumed aborted left MCA infarct of undetermined etiology. Chronic left caudate lacune.      NEURO: neurologically improved and appears at baseline,  permissive HTN iwth slow titration to normotension, titrate statin to LDL goal less than 70, MR imaging as noted above,  Physical therapy/OT eval pending.    ANTITHROMBOTIC THERAPY: ASA    PULMONARY: CXR clear, protecting airway, saturating well     CARDIOVASCULAR: check TTE, cardiac monitoring no acute events, she should have outpatient long term cardiac monitoring to screen for occult arrhythmia                              SBP goal: normotension    GASTROINTESTINAL:  dysphagia screen  passed, tolerating diet      Diet: puree     RENAL: BUN/Cr stable, good urine output      Na Goal: Greater than 135     Puente:    HEMATOLOGY: H/H stable, Platelets normal      DVT ppx: Heparin s.c [] LMWH []     ID: afebrile,  leukocytosis resolved, UA + WBC 53 moderate LE, short abx course for UTI    OTHER: current medical condition and plan of care d/w patient in extent at bedside, all questions answered and concerns addressed.     DISPOSITION: Rehab or home depending on PT eval as stable and workup is complete      CORE MEASURES:        Admission NIHSS: 5     TPA: [x] YES [] NO      LDL/HDL: 99/56     Depression Screen: 0     Statin Therapy:y      Dysphagia Screen: [x] PASS [] FAIL     Smoking [] YES [x] NO      Afib [] YES []x NO     Stroke Education [x] YES [] NO

## 2018-01-10 NOTE — DIETITIAN INITIAL EVALUATION ADULT. - ENERGY NEEDS
Ht: 64"  Wt: 105  BMI: 18 kg/m2   IBW: 120 (+/-10%)     114% IBW  Edema: none   Skin: no pressure injuries

## 2018-01-10 NOTE — OCCUPATIONAL THERAPY INITIAL EVALUATION ADULT - PLANNED THERAPY INTERVENTIONS, OT EVAL
ADL retraining/transfer training/cognitive, visual perceptual/bed mobility training/strengthening/balance training

## 2018-01-10 NOTE — PHYSICAL THERAPY INITIAL EVALUATION ADULT - PERTINENT HX OF CURRENT PROBLEM, REHAB EVAL
86 RHF unknown PMH denies taking daily ASA transferred for stroke rescue. Last normal at ~11:52 AM and found on ground 5 minutes later. No obvious evidence of trauma. Pt. presented to [place] and reportedly nonverbal, responsive only to pain, aphasic, R sided weakeness. Pt. received TPA there at 13:02. Upon arrival pt. was improved from prior exam. Patient is a poor historian.

## 2018-01-10 NOTE — OCCUPATIONAL THERAPY INITIAL EVALUATION ADULT - NS ASR FOLLOW COMMAND OT EVAL
75% of the time/able to follow single-step instructions/Patient followed simple step commands inconsistently, showed confusion. Required redirection and verbal prompts for responses.

## 2018-01-10 NOTE — DIETITIAN INITIAL EVALUATION ADULT. - OTHER INFO
Pt seen for: length of stay.  Adm dx:  CVA   GI issues: no N/V/D   Last BM: 1/8    Food allergies: NKFA   Vit/supplement PTA: none noted

## 2018-01-10 NOTE — DIETITIAN INITIAL EVALUATION ADULT. - PHYSICAL APPEARANCE
underweight/NFPE performed with pt consent, noted moderate muscle wasting in temporal and patella areas, moderate fat loss in upper arm

## 2018-01-10 NOTE — OCCUPATIONAL THERAPY INITIAL EVALUATION ADULT - FINE MOTOR COORDINATION, RIGHT HAND THUMB/FINGER OPPOSITION SKILLS, OT EVAL
normal performance normal performance/Pt. required additional time and verbal prompting to complete opposition.

## 2018-01-10 NOTE — OCCUPATIONAL THERAPY INITIAL EVALUATION ADULT - LIVES WITH, PROFILE
children/Patient lives in a private home with son. 2 steps entry with handrail to house. Additional 16 steps in home to second floor.

## 2018-01-10 NOTE — CHART NOTE - NSCHARTNOTEFT_GEN_A_CORE
Upon Nutritional Assessment by the Registered Dietitian your patient was determined to meet criteria / has evidence of the following diagnosis/diagnoses:          [ ]  Mild Protein Calorie Malnutrition        [x ]  Moderate Protein Calorie Malnutrition        [ ] Severe Protein Calorie Malnutrition        [ ] Unspecified Protein Calorie Malnutrition        [ ] Underweight / BMI <19        [ ] Morbid Obesity / BMI > 40      Findings as based on:  [x ] Comprehensive nutrition assessment   [x ] Nutrition Focused Physical Exam  [ ] Other:       Nutrition Plan/Recommendations:  recommend Ensure Enlive 2x/day        PROVIDER Section:     By signing this assessment you are acknowledging and agree with the diagnosis/diagnoses assigned by the Registered Dietitian    Comments:

## 2018-01-10 NOTE — PHYSICAL THERAPY INITIAL EVALUATION ADULT - PRECAUTIONS/LIMITATIONS, REHAB EVAL
fall precautions/1/8 CT Head and Neck: No CT evidence for acute infarct or acute hemorrhage. If the patient has new and persistent symptoms, consider short interval follow-up head CT or brain MRI follow-up if there are no MRI contraindications.

## 2018-01-10 NOTE — OCCUPATIONAL THERAPY INITIAL EVALUATION ADULT - DIAGNOSIS, OT EVAL
Decreased balance, strength, and cognition affecting ability to perform ADLs and functional mobility.

## 2018-01-10 NOTE — PROGRESS NOTE ADULT - SUBJECTIVE AND OBJECTIVE BOX
THE PATIENT WAS SEEN AND EXAMINED BY ME WITH THE HOUSESTAFF AND STROKE TEAM DURING MORNING ROUNDS.   HPI:  86 RHF unknown PMH denied taking daily ASA transferred for stroke rescue. Pt was last normal at approximately 11:52 AM day of admission and then found  down on the ground 5 minutes later. No obvious evidence of trauma. Patient presented to [place] where patient reportedly nonverbal, responsive only to pain, aphasic, right sided weakeness. Patient received TPA there at 13:02 1/8. Upon arrival patient was improved from prior exam. Patient is a poor historian. Son was not at bedside and phone number not available     SUBJECTIVE: No events overnight.  No new neurologic complaints.      aspirin enteric coated 81 milliGRAM(s) Oral daily  ATENolol  Tablet 25 milliGRAM(s) Oral daily  atorvastatin 40 milliGRAM(s) Oral at bedtime  enoxaparin Injectable 30 milliGRAM(s) SubCutaneous daily  hydrochlorothiazide 12.5 milliGRAM(s) Oral daily  sertraline 75 milliGRAM(s) Oral daily  trimethoprim  160 mG/sulfamethoxazole 800 mG 1 Tablet(s) Oral two times a day      PHYSICAL EXAM:   Vital Signs Last 24 Hrs  T(C): 36.7 (10 Morris 2018 06:00), Max: 36.9 (09 Jan 2018 20:00)  T(F): 98 (10 Morris 2018 06:00), Max: 98.5 (09 Jan 2018 20:00)  HR: 59 (10 Morris 2018 12:14) (52 - 71)  BP: 142/71 (10 Morris 2018 12:14) (110/48 - 179/73)  BP(mean): 105 (10 Morris 2018 12:00) (66 - 118)  RR: 16 (10 Morris 2018 12:00) (12 - 21)  SpO2: 97% (10 Morris 2018 12:14) (77% - 100%)    General: No acute distress  HEENT: EOM intact, visual fields full  Abdomen: Soft, nontender, nondistended   Extremities: No edema    NEUROLOGICAL EXAM:  Mental status: Awake, alert, oriented x3, no aphasia, no neglect, normal memory   Cranial Nerves: No facial asymmetry, no nystagmus, no dysarthria,  tongue midline  Motor exam: moves all extremities well against gravity.  Sensation: Intact to light touch   Coordination/ Gait: No dysmetria     LABS:                        11.6   9.3   )-----------( 238      ( 10 Morris 2018 04:15 )             33.8    01-10    141  |  103  |  22  ----------------------------<  102<H>  3.9   |  26  |  1.07    Ca    9.9      10 Morris 2018 04:15    PT/INR - ( 08 Jan 2018 15:31 )   PT: 11.7 sec;   INR: 1.07 ratio         PTT - ( 08 Jan 2018 15:31 )  PTT:26.2 sec  Hemoglobin A1C, Whole Blood: 5.7 % (01-08 @ 19:42)      IMAGING: Reviewed by me.     MR Head No Cont (01.09.18)   No evidence of acute cerebral ischemia.  Microvascular type white changes.  Left caudate head chronic lacune.  Bilateral basal ganglia dilated perivascular spaces and/or lacunae.      CT Angio Neck Head w/ IV Cont (01.08.18)   No evidence for significant intracranial or extracranial arterial   stenosis.

## 2018-01-10 NOTE — OCCUPATIONAL THERAPY INITIAL EVALUATION ADULT - ADDITIONAL COMMENTS
1/8 CT Head and Neck: No CT evidence for acute infarct or acute hemorrhage. If the patient has new and persistent symptoms, consider short interval follow-up head CT or brain MRI follow-up if there are no MRI contraindications.  1/8 X-Ray Chest: No consolidations or edema.

## 2018-01-10 NOTE — PHYSICAL THERAPY INITIAL EVALUATION ADULT - ADDITIONAL COMMENTS
Lives with son in a private home, +3 steps to enter, +flight to bedrooms with chair lift. Prior to admission, pt ambulated independently without a device.

## 2018-01-10 NOTE — OCCUPATIONAL THERAPY INITIAL EVALUATION ADULT - PERTINENT HX OF CURRENT PROBLEM, REHAB EVAL
86 RHF unknown PMH denies taking daily ASA transferred for stroke rescue. Last normal at ~11:52 AM and found on ground 5 minutes later. No obvious evidence of trauma. Pt. presented to [place] and reportedly nonverbal, responsive only to pain, aphasic, R sided weakeness. Pt. received TPA there at 13:02. Upon arrival pt. was improved from prior exam. Patient is a poor historian.

## 2018-01-11 VITALS
DIASTOLIC BLOOD PRESSURE: 70 MMHG | TEMPERATURE: 99 F | SYSTOLIC BLOOD PRESSURE: 122 MMHG | HEART RATE: 56 BPM | RESPIRATION RATE: 18 BRPM | OXYGEN SATURATION: 97 %

## 2018-01-11 PROCEDURE — 70498 CT ANGIOGRAPHY NECK: CPT

## 2018-01-11 PROCEDURE — 80048 BASIC METABOLIC PNL TOTAL CA: CPT

## 2018-01-11 PROCEDURE — 81003 URINALYSIS AUTO W/O SCOPE: CPT

## 2018-01-11 PROCEDURE — 93005 ELECTROCARDIOGRAM TRACING: CPT

## 2018-01-11 PROCEDURE — 70496 CT ANGIOGRAPHY HEAD: CPT

## 2018-01-11 PROCEDURE — 81001 URINALYSIS AUTO W/SCOPE: CPT

## 2018-01-11 PROCEDURE — 71045 X-RAY EXAM CHEST 1 VIEW: CPT

## 2018-01-11 PROCEDURE — 85730 THROMBOPLASTIN TIME PARTIAL: CPT

## 2018-01-11 PROCEDURE — 86850 RBC ANTIBODY SCREEN: CPT

## 2018-01-11 PROCEDURE — 96374 THER/PROPH/DIAG INJ IV PUSH: CPT | Mod: XU

## 2018-01-11 PROCEDURE — 97162 PT EVAL MOD COMPLEX 30 MIN: CPT

## 2018-01-11 PROCEDURE — 99285 EMERGENCY DEPT VISIT HI MDM: CPT | Mod: 25

## 2018-01-11 PROCEDURE — 86900 BLOOD TYPING SEROLOGIC ABO: CPT

## 2018-01-11 PROCEDURE — 85610 PROTHROMBIN TIME: CPT

## 2018-01-11 PROCEDURE — 85027 COMPLETE CBC AUTOMATED: CPT

## 2018-01-11 PROCEDURE — 97165 OT EVAL LOW COMPLEX 30 MIN: CPT

## 2018-01-11 PROCEDURE — 83036 HEMOGLOBIN GLYCOSYLATED A1C: CPT

## 2018-01-11 PROCEDURE — 87086 URINE CULTURE/COLONY COUNT: CPT

## 2018-01-11 PROCEDURE — 70450 CT HEAD/BRAIN W/O DYE: CPT

## 2018-01-11 PROCEDURE — 80061 LIPID PANEL: CPT

## 2018-01-11 PROCEDURE — 86901 BLOOD TYPING SEROLOGIC RH(D): CPT

## 2018-01-11 PROCEDURE — 70551 MRI BRAIN STEM W/O DYE: CPT

## 2018-01-11 RX ORDER — ATORVASTATIN CALCIUM 80 MG/1
1 TABLET, FILM COATED ORAL
Qty: 30 | Refills: 0 | OUTPATIENT
Start: 2018-01-11 | End: 2018-02-09

## 2018-01-11 RX ORDER — ASPIRIN/CALCIUM CARB/MAGNESIUM 324 MG
1 TABLET ORAL
Qty: 30 | Refills: 0 | OUTPATIENT
Start: 2018-01-11 | End: 2018-02-09

## 2018-01-11 RX ADMIN — Medication 81 MILLIGRAM(S): at 12:11

## 2018-01-11 RX ADMIN — ATENOLOL 25 MILLIGRAM(S): 25 TABLET ORAL at 05:46

## 2018-01-11 RX ADMIN — Medication 12.5 MILLIGRAM(S): at 05:46

## 2018-01-11 RX ADMIN — ENOXAPARIN SODIUM 30 MILLIGRAM(S): 100 INJECTION SUBCUTANEOUS at 12:12

## 2018-01-11 NOTE — PROGRESS NOTE ADULT - ASSESSMENT
ASSESSMENT: 86 RHF unknown PMH presented as a possible endovascular, s/p TPA for reported unresponsiveness, aphasia and right hemiplegia at [place]. NIHSS 5, pre MRS 2 Impression: Presumed aborted left MCA infarct of undetermined etiology. Chronic left caudate lacune.      NEURO: neurologically improved and appears at baseline,  permissive HTN iwth slow titration to normotension, titrate statin to LDL goal less than 70, MR imaging as noted above,  Physical therapy/OT eval pending.    ANTITHROMBOTIC THERAPY: ASA    PULMONARY: CXR clear, protecting airway, saturating well     CARDIOVASCULAR: check TTE, cardiac monitoring no acute events, she should have outpatient long term cardiac monitoring to screen for occult arrhythmia                              SBP goal: normotension    GASTROINTESTINAL:  dysphagia screen  passed, tolerating diet      Diet: puree     RENAL: BUN/Cr stable, good urine output      Na Goal: Greater than 135     Puente:    HEMATOLOGY: No s/s bleeding     DVT ppx: Heparin s.c [] LMWH [x]     ID: afebrile,  leukocytosis resolved, UA + WBC 53 moderate LE, short abx course for UTI, ua done 1/10 negative    OTHER: current medical condition and plan of care d/w patient in extent at bedside, all questions answered and concerns addressed.     DISPOSITION: DC to home today, will have 30 day holter as outpatient. Medically cleared for discharge      CORE MEASURES:        Admission NIHSS: 5     TPA: [x] YES [] NO      LDL/HDL: 99/56     Depression Screen: 0     Statin Therapy:y      Dysphagia Screen: [x] PASS [] FAIL     Smoking [] YES [x] NO      Afib [] YES []x NO     Stroke Education [x] YES [] NO ASSESSMENT: 86 RHF unknown PMH presented as a possible endovascular, s/p TPA for reported unresponsiveness, aphasia and right hemiplegia at [place]. NIHSS 5, pre MRS 2 Impression: Presumed aborted left MCA infarct of undetermined etiology. Chronic left caudate lacune.      NEURO: neurologically improved and appears at baseline,  slow titration to normotension, titrate statin to LDL goal less than 70, MR imaging as noted above,  Physical therapy/OT home with rolling walker.    ANTITHROMBOTIC THERAPY: ASA    PULMONARY: CXR clear, protecting airway, saturating well     CARDIOVASCULAR:  cardiac monitoring no acute events, she should have outpatient long term cardiac monitoring to screen for occult arrhythmia                              SBP goal: normotension    GASTROINTESTINAL:  dysphagia screen  passed, tolerating diet      Diet: Regular    RENAL: BUN/Cr without acute change, good urine output      Na Goal: Greater than 135     Puente: No    HEMATOLOGY: No s/s bleeding     DVT ppx: Heparin s.c [] LMWH [x]     ID: afebrile,  leukocytosis resolved, UA + WBC 53 moderate LE, short abx course for UTI, ua done 1/10 negative    OTHER: current medical condition and plan of care d/w patient in extent at bedside, all questions answered and concerns addressed.     DISPOSITION: DC to home today, will have 30 day holter as outpatient. Medically cleared for discharge      CORE MEASURES:        Admission NIHSS: 5     TPA: [x] YES [] NO      LDL/HDL: 99/56     Depression Screen: 0     Statin Therapy:y      Dysphagia Screen: [x] PASS [] FAIL     Smoking [] YES [x] NO      Afib [] YES []x NO     Stroke Education [x] YES [] NO

## 2018-01-11 NOTE — PROGRESS NOTE ADULT - SUBJECTIVE AND OBJECTIVE BOX
THE PATIENT WAS SEEN AND EXAMINED BY ME WITH THE HOUSESTAFF AND STROKE TEAM DURING MORNING ROUNDS.   HPI:  86 RHF unknown PMH denied taking daily ASA transferred for stroke rescue. Pt was last normal at approximately 11:52 AM day of admission and then found  down on the ground 5 minutes later. No obvious evidence of trauma. Patient presented to [place] where patient reportedly nonverbal, responsive only to pain, aphasic, right sided weakeness. Patient received TPA there at 13:02 1/8. Upon arrival patient was improved from prior exam. Patient is a poor historian. Son was not at bedside and phone number not available      SUBJECTIVE: No events overnight.  No new neurologic complaints.      aspirin enteric coated 81 milliGRAM(s) Oral daily  ATENolol  Tablet 25 milliGRAM(s) Oral daily  atorvastatin 40 milliGRAM(s) Oral at bedtime  enoxaparin Injectable 30 milliGRAM(s) SubCutaneous daily  hydrochlorothiazide 12.5 milliGRAM(s) Oral daily  sertraline 75 milliGRAM(s) Oral daily      PHYSICAL EXAM:   Vital Signs Last 24 Hrs  T(C): 37.2 (11 Jan 2018 07:36), Max: 37.2 (11 Jan 2018 07:36)  T(F): 99 (11 Jan 2018 07:36), Max: 99 (11 Jan 2018 07:36)  HR: 53 (11 Jan 2018 07:36) (53 - 71)  BP: 134/74 (11 Jan 2018 07:36) (109/55 - 158/67)  BP(mean): 93 (10 Morris 2018 22:00) (79 - 105)  RR: 20 (11 Jan 2018 07:36) (14 - 20)  SpO2: 96% (11 Jan 2018 07:36) (88% - 100%)    General: No acute distress  HEENT: EOM intact, visual fields full  Abdomen: Soft, nontender, nondistended   Extremities: No edema    NEUROLOGICAL EXAM:  Mental status: Awake, alert, oriented x3, no aphasia, no neglect, normal memory   Cranial Nerves: No facial asymmetry, no nystagmus, no dysarthria,  tongue midline  Motor exam: moves all extremities well against gravity.  Sensation: Intact to light touch   Coordination/ Gait: No dysmetria     LABS:                        11.6   9.3   )-----------( 238      ( 10 Morris 2018 04:15 )             33.8    01-10    141  |  103  |  22  ----------------------------<  102<H>  3.9   |  26  |  1.07    Ca    9.9      10 Morris 2018 04:15      Hemoglobin A1C, Whole Blood: 5.7 % (01-08 @ 19:42)      IMAGING: Reviewed by me.   MR Head No Cont (01.09.18)   No evidence of acute cerebral ischemia.  Microvascular type white changes.  Left caudate head chronic lacune.  Bilateral basal ganglia dilated perivascular spaces and/or lacunae.      CT Angio Neck Head w/ IV Cont (01.08.18)   No evidence for significant intracranial or extracranial arterial   stenosis. THE PATIENT WAS SEEN AND EXAMINED BY ME WITH THE HOUSESTAFF AND STROKE TEAM DURING MORNING ROUNDS.   HPI:  86 RHF unknown PMH denied taking daily ASA transferred for stroke rescue. Pt was last normal at approximately 11:52 AM day of admission and then found  down on the ground 5 minutes later. No obvious evidence of trauma. Patient presented to [place] where patient reportedly nonverbal, responsive only to pain, aphasic, right sided weakeness. Patient received TPA there at 13:02 1/8. Upon arrival patient was improved from prior exam. Patient is a poor historian. Son was not at bedside and phone number not available    SUBJECTIVE: No events overnight.  No new neurologic complaints.      aspirin enteric coated 81 milliGRAM(s) Oral daily  ATENolol  Tablet 25 milliGRAM(s) Oral daily  atorvastatin 40 milliGRAM(s) Oral at bedtime  enoxaparin Injectable 30 milliGRAM(s) SubCutaneous daily  hydrochlorothiazide 12.5 milliGRAM(s) Oral daily  sertraline 75 milliGRAM(s) Oral daily      PHYSICAL EXAM:   Vital Signs Last 24 Hrs  T(C): 37.2 (11 Jan 2018 07:36), Max: 37.2 (11 Jan 2018 07:36)  T(F): 99 (11 Jan 2018 07:36), Max: 99 (11 Jan 2018 07:36)  HR: 53 (11 Jan 2018 07:36) (53 - 71)  BP: 134/74 (11 Jan 2018 07:36) (109/55 - 158/67)  BP(mean): 93 (10 Morris 2018 22:00) (79 - 105)  RR: 20 (11 Jan 2018 07:36) (14 - 20)  SpO2: 96% (11 Jan 2018 07:36) (88% - 100%)    General: No acute distress  HEENT: EOM intact, visual fields full  Abdomen: Soft, nontender, nondistended   Extremities: No edema    NEUROLOGICAL EXAM:  Mental status: Awake, alert, oriented x3, no aphasia, no neglect, normal memory   Cranial Nerves: No facial asymmetry, no nystagmus, no dysarthria,  tongue midline  Motor exam: moves all extremities well against gravity.  Sensation: Intact to light touch   Coordination/ Gait: No dysmetria     LABS:                        11.6   9.3   )-----------( 238      ( 10 Morris 2018 04:15 )             33.8    01-10    141  |  103  |  22  ----------------------------<  102<H>  3.9   |  26  |  1.07    Ca    9.9      10 Morris 2018 04:15      Hemoglobin A1C, Whole Blood: 5.7 % (01-08 @ 19:42)      IMAGING: Reviewed by me.   MR Head No Cont (01.09.18)   No evidence of acute cerebral ischemia.  Microvascular type white changes.  Left caudate head chronic lacune.  Bilateral basal ganglia dilated perivascular spaces and/or lacunae.      CT Angio Neck Head w/ IV Cont (01.08.18)   No evidence for significant intracranial or extracranial arterial   stenosis.

## 2018-01-19 PROBLEM — Z00.00 ENCOUNTER FOR PREVENTIVE HEALTH EXAMINATION: Status: ACTIVE | Noted: 2018-01-19

## 2018-02-16 ENCOUNTER — APPOINTMENT (OUTPATIENT)
Dept: NEUROLOGY | Facility: CLINIC | Age: 83
End: 2018-02-16

## 2018-07-23 NOTE — ED PROVIDER NOTE - PHYSICAL EXAMINATION
72-year-old white female well known to us with history of colon carcinoma in 2013 with resection with right hemicolectomy followed by chemotherapy. The patient last had a colonoscopy in January of 2017 with diverticulosis of the sigmoid colon but no lesions found. She was at [facility] 2 weeks ago and had a PET scan which shows a potential lesion in the descending colon. No other lesions were seen. She was treated with Cipro and Flagyl by them and told wait 2-3 weeks before a colonoscopy. She has had some loose stools related to the antibiotics but nothing changed prior to that. There has been no bleeding. She does have reflux symptoms that she controls with over-the-counter omeprazole. GEN: responsive to pain only  EYES: clear, midline gaze, 1mm R pupil, 3mm L pupil round and reactive bilaterally   ENT: mucus membranes are moist, oropharynx is within normal limits, tongue midline  RESP: chest nontender. lungs clear to auscultation bilaterally, equal breath sounds bilaterally, trachea midline  CV: normal rate, regular rhythm, S1, S2  ABD: soft, nontender, nondistended  : no CVAT  MSK: no extremity tenderness or deformity, skin tear right forearm, midline cervical tenderness, no stepoff, no stepoff or deferomity or tendernes sof T or L spine, pelvis stable  NEURO: responsive only to pain, nonsensical words, not obeying commands, protecting airway, initially R-hemipelgia imrpose to R-weakness, R-facial droop, responsive to pain in all 4 extremities, unable to assess cerebellar function, unable to assess attention/neglect, words spoken without dysarthria. Stroke scale listed in code stroke sheet.  SKIN: warm, dry, no bruising, skin tear R-forearm

## 2018-09-24 ENCOUNTER — INPATIENT (INPATIENT)
Facility: HOSPITAL | Age: 83
LOS: 3 days | Discharge: NURSING, REHAB OR LONG-TERM CARE | DRG: 511 | End: 2018-09-28
Attending: HOSPITALIST | Admitting: HOSPITALIST
Payer: MEDICARE

## 2018-09-24 ENCOUNTER — TRANSCRIPTION ENCOUNTER (OUTPATIENT)
Age: 83
End: 2018-09-24

## 2018-09-24 VITALS — WEIGHT: 100.09 LBS | HEIGHT: 65 IN

## 2018-09-24 DIAGNOSIS — E78.5 HYPERLIPIDEMIA, UNSPECIFIED: ICD-10-CM

## 2018-09-24 DIAGNOSIS — Z29.9 ENCOUNTER FOR PROPHYLACTIC MEASURES, UNSPECIFIED: ICD-10-CM

## 2018-09-24 DIAGNOSIS — Z90.710 ACQUIRED ABSENCE OF BOTH CERVIX AND UTERUS: Chronic | ICD-10-CM

## 2018-09-24 DIAGNOSIS — S42.401S UNSPECIFIED FRACTURE OF LOWER END OF RIGHT HUMERUS, SEQUELA: ICD-10-CM

## 2018-09-24 DIAGNOSIS — I10 ESSENTIAL (PRIMARY) HYPERTENSION: ICD-10-CM

## 2018-09-24 DIAGNOSIS — F32.9 MAJOR DEPRESSIVE DISORDER, SINGLE EPISODE, UNSPECIFIED: ICD-10-CM

## 2018-09-24 LAB
ALBUMIN SERPL ELPH-MCNC: 4.4 G/DL — SIGNIFICANT CHANGE UP (ref 3.3–5.2)
ALP SERPL-CCNC: 107 U/L — SIGNIFICANT CHANGE UP (ref 40–120)
ALT FLD-CCNC: 12 U/L — SIGNIFICANT CHANGE UP
ANION GAP SERPL CALC-SCNC: 15 MMOL/L — SIGNIFICANT CHANGE UP (ref 5–17)
APTT BLD: 26.4 SEC — LOW (ref 27.5–37.4)
AST SERPL-CCNC: 23 U/L — SIGNIFICANT CHANGE UP
BASOPHILS # BLD AUTO: 0 K/UL — SIGNIFICANT CHANGE UP (ref 0–0.2)
BASOPHILS NFR BLD AUTO: 0.2 % — SIGNIFICANT CHANGE UP (ref 0–2)
BILIRUB SERPL-MCNC: 1.4 MG/DL — SIGNIFICANT CHANGE UP (ref 0.4–2)
BUN SERPL-MCNC: 23 MG/DL — HIGH (ref 8–20)
CALCIUM SERPL-MCNC: 10 MG/DL — SIGNIFICANT CHANGE UP (ref 8.6–10.2)
CHLORIDE SERPL-SCNC: 99 MMOL/L — SIGNIFICANT CHANGE UP (ref 98–107)
CO2 SERPL-SCNC: 25 MMOL/L — SIGNIFICANT CHANGE UP (ref 22–29)
CREAT SERPL-MCNC: 0.87 MG/DL — SIGNIFICANT CHANGE UP (ref 0.5–1.3)
EOSINOPHIL # BLD AUTO: 0.1 K/UL — SIGNIFICANT CHANGE UP (ref 0–0.5)
EOSINOPHIL NFR BLD AUTO: 0.7 % — SIGNIFICANT CHANGE UP (ref 0–6)
GLUCOSE SERPL-MCNC: 149 MG/DL — HIGH (ref 70–115)
HCT VFR BLD CALC: 34.7 % — LOW (ref 37–47)
HGB BLD-MCNC: 11 G/DL — LOW (ref 12–16)
INR BLD: 1.14 RATIO — SIGNIFICANT CHANGE UP (ref 0.88–1.16)
LYMPHOCYTES # BLD AUTO: 15.5 % — LOW (ref 20–55)
LYMPHOCYTES # BLD AUTO: 2 K/UL — SIGNIFICANT CHANGE UP (ref 1–4.8)
MCHC RBC-ENTMCNC: 29.1 PG — SIGNIFICANT CHANGE UP (ref 27–31)
MCHC RBC-ENTMCNC: 31.7 G/DL — LOW (ref 32–36)
MCV RBC AUTO: 91.8 FL — SIGNIFICANT CHANGE UP (ref 81–99)
MONOCYTES # BLD AUTO: 1.2 K/UL — HIGH (ref 0–0.8)
MONOCYTES NFR BLD AUTO: 9.8 % — SIGNIFICANT CHANGE UP (ref 3–10)
NEUTROPHILS # BLD AUTO: 9.4 K/UL — HIGH (ref 1.8–8)
NEUTROPHILS NFR BLD AUTO: 73.4 % — HIGH (ref 37–73)
PLATELET # BLD AUTO: 267 K/UL — SIGNIFICANT CHANGE UP (ref 150–400)
POTASSIUM SERPL-MCNC: 3.5 MMOL/L — SIGNIFICANT CHANGE UP (ref 3.5–5.3)
POTASSIUM SERPL-SCNC: 3.5 MMOL/L — SIGNIFICANT CHANGE UP (ref 3.5–5.3)
PROT SERPL-MCNC: 8 G/DL — SIGNIFICANT CHANGE UP (ref 6.6–8.7)
PROTHROM AB SERPL-ACNC: 12.6 SEC — SIGNIFICANT CHANGE UP (ref 9.8–12.7)
RBC # BLD: 3.78 M/UL — LOW (ref 4.4–5.2)
RBC # FLD: 14.1 % — SIGNIFICANT CHANGE UP (ref 11–15.6)
SODIUM SERPL-SCNC: 139 MMOL/L — SIGNIFICANT CHANGE UP (ref 135–145)
WBC # BLD: 12.7 K/UL — HIGH (ref 4.8–10.8)
WBC # FLD AUTO: 12.7 K/UL — HIGH (ref 4.8–10.8)

## 2018-09-24 PROCEDURE — 73090 X-RAY EXAM OF FOREARM: CPT | Mod: 26,LT

## 2018-09-24 PROCEDURE — 73060 X-RAY EXAM OF HUMERUS: CPT | Mod: 26,RT

## 2018-09-24 PROCEDURE — 71045 X-RAY EXAM CHEST 1 VIEW: CPT | Mod: 26

## 2018-09-24 PROCEDURE — 99222 1ST HOSP IP/OBS MODERATE 55: CPT

## 2018-09-24 PROCEDURE — 73070 X-RAY EXAM OF ELBOW: CPT | Mod: 26,52,59,RT

## 2018-09-24 PROCEDURE — 73080 X-RAY EXAM OF ELBOW: CPT | Mod: 26,RT

## 2018-09-24 RX ORDER — PROPOFOL 10 MG/ML
20 INJECTION, EMULSION INTRAVENOUS ONCE
Qty: 0 | Refills: 0 | Status: COMPLETED | OUTPATIENT
Start: 2018-09-24 | End: 2018-09-24

## 2018-09-24 RX ORDER — KETAMINE HYDROCHLORIDE 100 MG/ML
20 INJECTION INTRAMUSCULAR; INTRAVENOUS ONCE
Qty: 0 | Refills: 0 | Status: DISCONTINUED | OUTPATIENT
Start: 2018-09-24 | End: 2018-09-24

## 2018-09-24 RX ORDER — ONDANSETRON 8 MG/1
4 TABLET, FILM COATED ORAL EVERY 6 HOURS
Qty: 0 | Refills: 0 | Status: DISCONTINUED | OUTPATIENT
Start: 2018-09-24 | End: 2018-09-25

## 2018-09-24 RX ORDER — CEFAZOLIN SODIUM 1 G
2000 VIAL (EA) INJECTION ONCE
Qty: 0 | Refills: 0 | Status: COMPLETED | OUTPATIENT
Start: 2018-09-24 | End: 2018-09-24

## 2018-09-24 RX ADMIN — PROPOFOL 20 MILLIGRAM(S): 10 INJECTION, EMULSION INTRAVENOUS at 22:34

## 2018-09-24 RX ADMIN — Medication 100 MILLIGRAM(S): at 23:08

## 2018-09-24 RX ADMIN — KETAMINE HYDROCHLORIDE 20 MILLIGRAM(S): 100 INJECTION INTRAMUSCULAR; INTRAVENOUS at 22:40

## 2018-09-24 NOTE — ED PROVIDER NOTE - CARE PLAN
Principal Discharge DX:	Closed fracture of right elbow, initial encounter Principal Discharge DX:	Open fracture of elbow, right, sequela

## 2018-09-24 NOTE — ED PROVIDER NOTE - ATTENDING CONTRIBUTION TO CARE
I, Gabino Dempsey, performed the initial face to face bedside interview with this patient regarding history of present illness, review of symptoms and relevant past medical, social and family history.  I completed an independent physical examination.  I was the initial provider who evaluated this patient.  The history, relevant review of systems, past medical and surgical history, medical decision making, and physical examination was documented by the scribe in my presence and I attest to the accuracy of the documentation.  I have signed out the follow up of any pending tests (i.e. labs, radiological studies) to the ACP.  I have communicated the patient’s plan of care and disposition with the ACP.

## 2018-09-24 NOTE — ED ADULT TRIAGE NOTE - CHIEF COMPLAINT QUOTE
rt elbow injury s/p trip and fall. wrist, shoulder. 10pm last night. slid on a couple of steps, son states 1 step. No head injury, no LOC.  ortho sent for possible fracture

## 2018-09-24 NOTE — H&P ADULT - PROBLEM SELECTOR PLAN 3
Patient w/ sinus bradycardia.   -will hold atenolol at this time and consult cardiology for further input. HR in 40-50 range.   -optimally patient should remain on beta blockade prior to OR if tolerates.   -will consult cardiology as stated above. Patient w/ sinus bradycardia.   -will hold atenolol at this time and consult cardiology for further input. HR in 40-50 range.   -optimally patient should remain on beta blockade prior to OR if tolerates.   -will consult cardiology as stated above.  -awaiting EKG, discussed with nursing staff.

## 2018-09-24 NOTE — ED PROCEDURE NOTE - NS_POSTPROCCAREGUIDE_ED_ALL_ED
Patient is now fully awake, with vital signs and temperature stable, hydration is adequate, patients Renny’s  score is at baseline (or greater than 8), patient and escort has received  discharge education.

## 2018-09-24 NOTE — H&P ADULT - PMH
Aortic valve stenosis, etiology of cardiac valve disease unspecified    Depression, unspecified depression type    Essential hypertension    HLD (hyperlipidemia)

## 2018-09-24 NOTE — H&P ADULT - PROBLEM SELECTOR PLAN 6
-patient on sertraline as outpatient.   -resume once family brings in accurate med list with dosages

## 2018-09-24 NOTE — H&P ADULT - HISTORY OF PRESENT ILLNESS
Pt is a 86 yo F presenting to the ED after mechanical fall on 9/23/18 resulting in R elbow fx. PMH HTN, HLD, Depression, Dementia, Aortic Stenosis.   Patient received conscious sedation and difficult to obtain history as she was still drowsy. History primarily obtained from medical record, discussion w/ sons at bedside.   Patient was walking up the stairs in her home yesterday with a blanket in her hands and tripped on the blanket falling down approximately 2 stairs. She  landed on her hands as per family, did not lose consciousness, no pre-syncopal symptoms, no preceeding light-headedness, dizziness, palpitations as per family (patient states same) and she had a fall. She went to get XR performed and was then sent to ED by ortho. She received conscious sedation so ROS is limited and provided by family members (sons).   Patient is functionally active, no hx of MI/stroke, medical problems as stated above. She has no know hx of adverse rxn to anesthesia. Had hysterectomy when she was younger and back surgery. She does not follow with a cardiologist.     In ED patient had Xray performed which showed open R elbow fx, received consious sedation and had reduction in ED w/ ER attending and Ortho PA. Labs unremarkable.

## 2018-09-24 NOTE — ED PROCEDURE NOTE - NS ED PERI VASCULAR NEG
capillary refill time < 2 seconds/no paresthesia/fingers/toes warm to touch/no swelling/no cyanosis of extremity

## 2018-09-24 NOTE — H&P ADULT - NSHPATTENDINGPLANDISCUSS_GEN_ALL_CORE
SUBJECTIVE:   Nicole Phoenix is a 38 year old female who presents to clinic today for the following health issues:      URINARY TRACT SYMPTOMS  Onset: 2 days    Description:   Painful urination (Dysuria): no   Blood in urine (Hematuria): no   Delay in urine (Hesitency): YES    Intensity: severe    Progression of Symptoms:  same    Accompanying Signs & Symptoms:  Fever/chills: no   Flank pain YES  Nausea and vomiting: no   Any vaginal symptoms: none  Abdominal/Pelvic Pain: YES- pelvic    History:   History of frequent UTI's: no   History of kidney stones: no   Sexually Active: YES  Possibility of pregnancy: No    Precipitating factors:   nothing    Therapies Tried and outcome: Ibuprofen, Tylenol, AZO, no relief    Patient is a 38 year old female who is in clinic today for UTI symptoms. She said that her most bothersome symptom is a lower abdominal pain that began yesterday. Overnight she said that she also had a dull pain in her back. Denies dysuria, frequency, hematuria, fever, flank pain, nausea or inability to find a comfortable position to sit in. Currently , less concerned about STI and has not had discharge from the vagina. Has history of previous UTI some years ago.     Problem list and histories reviewed & adjusted, as indicated.  Additional history: as documented    Patient Active Problem List   Diagnosis     Fibromyalgia     Intractable chronic migraine without aura and without status migrainosus     Syncope, unspecified syncope type     Neck muscle spasm     Cervicalgia     Cervical high risk HPV (human papillomavirus) test positive     Contact dermatitis due to poison ivy     Severe episode of recurrent major depressive disorder, without psychotic features (H)     Past Surgical History:   Procedure Laterality Date     C  DELIVERY+POSTPARTUM CARE       HC REMOVAL OF TONSILS,<11 Y/O       HC REPAIR EXTEN TENDON FINGER W/O GRAFT, EACH  03/15/09    right ring finger       Social History    Substance Use Topics     Smoking status: Current Some Day Smoker     Packs/day: 0.10     Years: 4.00     Types: Cigarettes     Smokeless tobacco: Never Used     Alcohol use No     Family History   Problem Relation Age of Onset     Breast Cancer Maternal Grandmother      Alcohol/Drug Maternal Grandfather      recovering alcoholic     DIABETES Paternal Grandmother      CEREBROVASCULAR DISEASE Paternal Grandmother          Current Outpatient Prescriptions   Medication Sig Dispense Refill     meloxicam (MOBIC) 15 MG tablet Take 1 tablet (15 mg) by mouth daily 14 tablet 0     mirtazapine (REMERON) 15 MG tablet TAKE ONE TABLET BY MOUTH AT BEDTIME 30 tablet 1     nitroFURantoin, macrocrystal-monohydrate, (MACROBID) 100 MG capsule Take 1 capsule (100 mg) by mouth 2 times daily 14 capsule 0     venlafaxine (EFFEXOR-XR) 75 MG 24 hr capsule TAKE ONE CAPSULE BY MOUTH ONCE DAILY 30 capsule 0     Allergies   Allergen Reactions     No Known Drug Allergies      Poison Ivy Extract [Extract Of Poison Ivy]        Reviewed and updated as needed this visit by clinical staff  Tobacco  Allergies  Meds  Med Hx  Surg Hx  Fam Hx  Soc Hx      Reviewed and updated as needed this visit by Provider         ROS:  Constitutional, cardiovascular, pulmonary, gi and gu systems are negative, except as otherwise noted.    OBJECTIVE:     /64 (BP Location: Right arm, Patient Position: Chair, Cuff Size: Adult Large)  Pulse 80  Temp 97  F (36.1  C) (Oral)  Resp 16  Wt 136 lb (61.7 kg)  BMI 24.87 kg/m2  Body mass index is 24.87 kg/(m^2).  GENERAL: healthy, alert and no distress  RESP: lungs clear to auscultation - no rales, rhonchi or wheezes  CV: regular rate and rhythm, normal S1 S2, no S3 or S4, no murmur, click or rub, no peripheral edema and peripheral pulses strong  ABDOMEN: soft, tender to deep palpation of the suprapubic area, no hepatosplenomegaly, no masses and bowel sounds normal. Negative CVA tenderness  MS: no gross  musculoskeletal defects noted, no edema    Diagnostic Test Results:  Results for orders placed or performed in visit on 06/14/18 (from the past 24 hour(s))   Urine Microscopic   Result Value Ref Range    WBC Urine 0 - 5 OTO5^0 - 5 /HPF    RBC Urine O - 2 OTO2^O - 2 /HPF    Hyaline Casts 2-5 (A) OTO2^O - 2 /LPF    Squamous Epithelial /LPF Urine Many (A) FEW^Few /LPF    Bacteria Urine Moderate (A) NEG^Negative /HPF    Mucous Urine Present (A) NEG^Negative /LPF       ASSESSMENT/PLAN:     1. Dysuria  Urinary analysis returned positive for infection. Agreed to start macrobid for 1 week. Patient requested additional medication for pain control, will send Mobic to be used 1 tablet daily as needed. Patient will contact clinic if not improving.   - Urine Microscopic  - nitroFURantoin, macrocrystal-monohydrate, (MACROBID) 100 MG capsule; Take 1 capsule (100 mg) by mouth 2 times daily  Dispense: 14 capsule; Refill: 0  - meloxicam (MOBIC) 15 MG tablet; Take 1 tablet (15 mg) by mouth daily  Dispense: 14 tablet; Refill: 0    Follow up with clinic as needed or sooner if conditions change, worsen or fail to improve as expected.      Win Reyna PA-C  [facility]     Patient, RN, ortho PA Dr. Ke Lovelace and patients sons

## 2018-09-24 NOTE — H&P ADULT - PROBLEM SELECTOR PLAN 2
-patient w/ significant AS on auscultation, no evidence of overt heart failure.   -consult cardiology for cardiac clearance and optimization from cardiac standpoint -patient w/ significant AS on auscultation, no evidence of overt heart failure.   -consult cardiology for cardiac clearance and optimization from cardiac standpoint  - I explained to patient and her sons that patient at increased risk for cardiac event given her AS.   -avoid aggressive IVF

## 2018-09-24 NOTE — ED ADULT NURSE NOTE - OBJECTIVE STATEMENT
Assumed care at 1955.  A+OX4, c/o L elbow and L shoulder pain.  in no apparent distress.  s/p fall down stairs last night.  Denies hitting head, no loc, no bld thinners.  abrasion to L elbow.  Denies any other complaints at this time

## 2018-09-24 NOTE — ED PROVIDER NOTE - PROGRESS NOTE DETAILS
PA NOTE: Pt seen by intake physician and HPI/ROS/PE/MDM reviewed. Pt seen and evaluated. Discussed plan and any resulted studies at this time. Will continue to monitor and re-evaluate.  Re-Evaluation: pt is sitting comfortably in chair and in no apparent distress. informed of elbow fracture with displacement/dislocation. Consulted Ortho PA and they will come see patient.

## 2018-09-24 NOTE — ED PROVIDER NOTE - OBJECTIVE STATEMENT
88 y/o F pt presents to ED c/o R elbow pain s/p fall that occurred last night at 22:00. Pt tripped and slid down 1-2 stairs per son. Pt was seen by ortho today and was sent for an X-ray. The X-ray was inconclusive and she was referred to the ED. Denies LOC, denies fever. denies HA or neck pain. no chest pain or sob. no abd pain. no n/v/d. no urinary f/u/d. no back pain. no motor or sensory deficits. denies illicit drug use. no recent travel. no rash. no other acute issues symptoms or concerns

## 2018-09-24 NOTE — H&P ADULT - NSHPPHYSICALEXAM_GEN_ALL_CORE
T(C): 37.2 (09-24-18 @ 19:39), Max: 37.2 (09-24-18 @ 19:39)  HR: 47 (09-24-18 @ 23:22) (44 - 54)  BP: 170/75 (09-24-18 @ 23:22) (139/64 - 179/78)  RR: 18 (09-24-18 @ 23:22) (18 - 20)  SpO2: 100% (09-24-18 @ 23:22) (95% - 100%)  Wt(kg): --    Physical Exam:   GENERAL: frail elderly female, , responds to verbal stimuli, arousable  HEENT: head NC/AT; EOM intact, PERRLA, conjunctiva & sclera clear; moist mucous membranes  NECK: supple, no JVD  RESPIRATORY: CTA B/L, no wheezing, rales, rhonchi or rubs  CARDIOVASCULAR: S1&S2, RRR, +4/6 JAVI RUSB w/ radiation to carotids  ABDOMEN: soft, non-tender, non-distended, + Bowel sounds x4 quadrants, no guarding, rebound or rigidity  MUSCULOSKELETAL:  no clubbing, cyanosis or edema of LE. RUE in splint s/p reduction/splinting  LYMPH: no cervical lymphadenopathy  VASCULAR: Radial pulses 2+ bilaterally, no varicose veins   SKIN: warm and dry, color normal  NEUROLOGIC: alert, responsive to verbal and tactile stimuli, MAEx4  Psych: Normal mood and affect, normal behavior T(C): 37.2 (09-24-18 @ 19:39), Max: 37.2 (09-24-18 @ 19:39)  HR: 47 (09-24-18 @ 23:22) (44 - 54)  BP: 170/75 (09-24-18 @ 23:22) (139/64 - 179/78)  RR: 18 (09-24-18 @ 23:22) (18 - 20)  SpO2: 100% (09-24-18 @ 23:22) (95% - 100%)  Wt(kg): --    Physical Exam:   GENERAL: frail elderly female, , responds to verbal stimuli, arousable  HEENT: head NC/AT; EOM intact, PERRLA, conjunctiva & sclera clear; moist mucous membranes  NECK: supple, no JVD  RESPIRATORY: CTA B/L, no wheezing, rales, rhonchi or rubs  CARDIOVASCULAR: S1&S2, RRR, +4/6 JAVI RUSB w/ radiation to carotids  ABDOMEN: soft, non-tender, non-distended, + Bowel sounds x4 quadrants, no guarding, rebound or rigidity  MUSCULOSKELETAL:  no clubbing, cyanosis or edema of LE. RUE in splint s/p reduction/splinting  LYMPH: no cervical lymphadenopathy  VASCULAR: Radial pulses 2+ bilaterally, no varicose veins   SKIN: warm and dry, color normal  NEUROLOGIC: alert, responsive to verbal and tactile stimuli, MAEx4, CN 2-12 intact, 5/5 LLE/RLE, LUE muslce strength, normal finger  b/l UE  Psych: Normal mood and affect, normal behavior

## 2018-09-24 NOTE — H&P ADULT - PROBLEM SELECTOR PLAN 1
-s/p conscious sedation and reduction by Dr. Dempsey and Radu (Ortho PA).   -plan for OR in AM.   -will consult cardiology for cardiac clearance in setting AS and bradycardia though patient has open fracture and will ultimately need surgery . -s/p conscious sedation and reduction by Dr. Dempsey and Radu (Ortho PA). Patient to receive Ancef due to open fracture  -plan for OR in AM.   -will consult cardiology for cardiac clearance in setting AS and bradycardia though patient has open fracture and will ultimately need surgery .

## 2018-09-24 NOTE — ED PROVIDER NOTE - MEDICAL DECISION MAKING DETAILS
reduced under sconcious sedation no pain elsewhere neurovasc intact admitted medciine ortho recs implemented

## 2018-09-24 NOTE — H&P ADULT - ASSESSMENT
Pt is a 86 yo F presenting to the ED after mechanical fall on 9/23/18 resulting in open R elbow fx. PMH HTN, HLD, Depression, Dementia, Aortic Stenosis.

## 2018-09-25 ENCOUNTER — TRANSCRIPTION ENCOUNTER (OUTPATIENT)
Age: 83
End: 2018-09-25

## 2018-09-25 LAB
ABO RH CONFIRMATION: SIGNIFICANT CHANGE UP
ANION GAP SERPL CALC-SCNC: 14 MMOL/L — SIGNIFICANT CHANGE UP (ref 5–17)
APPEARANCE UR: CLEAR — SIGNIFICANT CHANGE UP
BACTERIA # UR AUTO: ABNORMAL
BASOPHILS # BLD AUTO: 0 K/UL — SIGNIFICANT CHANGE UP (ref 0–0.2)
BASOPHILS NFR BLD AUTO: 0.2 % — SIGNIFICANT CHANGE UP (ref 0–2)
BILIRUB UR-MCNC: NEGATIVE — SIGNIFICANT CHANGE UP
BLD GP AB SCN SERPL QL: SIGNIFICANT CHANGE UP
BUN SERPL-MCNC: 21 MG/DL — HIGH (ref 8–20)
CALCIUM SERPL-MCNC: 9.4 MG/DL — SIGNIFICANT CHANGE UP (ref 8.6–10.2)
CHLORIDE SERPL-SCNC: 102 MMOL/L — SIGNIFICANT CHANGE UP (ref 98–107)
CO2 SERPL-SCNC: 24 MMOL/L — SIGNIFICANT CHANGE UP (ref 22–29)
COLOR SPEC: YELLOW — SIGNIFICANT CHANGE UP
CREAT SERPL-MCNC: 0.83 MG/DL — SIGNIFICANT CHANGE UP (ref 0.5–1.3)
DIFF PNL FLD: NEGATIVE — SIGNIFICANT CHANGE UP
EOSINOPHIL # BLD AUTO: 0.1 K/UL — SIGNIFICANT CHANGE UP (ref 0–0.5)
EOSINOPHIL NFR BLD AUTO: 1.1 % — SIGNIFICANT CHANGE UP (ref 0–6)
EPI CELLS # UR: SIGNIFICANT CHANGE UP
GLUCOSE SERPL-MCNC: 114 MG/DL — SIGNIFICANT CHANGE UP (ref 70–115)
GLUCOSE UR QL: NEGATIVE MG/DL — SIGNIFICANT CHANGE UP
HCT VFR BLD CALC: 30.4 % — LOW (ref 37–47)
HGB BLD-MCNC: 9.7 G/DL — LOW (ref 12–16)
KETONES UR-MCNC: NEGATIVE — SIGNIFICANT CHANGE UP
LEUKOCYTE ESTERASE UR-ACNC: ABNORMAL
LYMPHOCYTES # BLD AUTO: 2.3 K/UL — SIGNIFICANT CHANGE UP (ref 1–4.8)
LYMPHOCYTES # BLD AUTO: 24.8 % — SIGNIFICANT CHANGE UP (ref 20–55)
MCHC RBC-ENTMCNC: 29 PG — SIGNIFICANT CHANGE UP (ref 27–31)
MCHC RBC-ENTMCNC: 31.9 G/DL — LOW (ref 32–36)
MCV RBC AUTO: 90.7 FL — SIGNIFICANT CHANGE UP (ref 81–99)
MONOCYTES # BLD AUTO: 1 K/UL — HIGH (ref 0–0.8)
MONOCYTES NFR BLD AUTO: 10.5 % — HIGH (ref 3–10)
NEUTROPHILS # BLD AUTO: 5.9 K/UL — SIGNIFICANT CHANGE UP (ref 1.8–8)
NEUTROPHILS NFR BLD AUTO: 63.1 % — SIGNIFICANT CHANGE UP (ref 37–73)
NITRITE UR-MCNC: NEGATIVE — SIGNIFICANT CHANGE UP
PH UR: 6 — SIGNIFICANT CHANGE UP (ref 5–8)
PLATELET # BLD AUTO: 237 K/UL — SIGNIFICANT CHANGE UP (ref 150–400)
POTASSIUM SERPL-MCNC: 3.1 MMOL/L — LOW (ref 3.5–5.3)
POTASSIUM SERPL-SCNC: 3.1 MMOL/L — LOW (ref 3.5–5.3)
PROT UR-MCNC: 15 MG/DL
RBC # BLD: 3.35 M/UL — LOW (ref 4.4–5.2)
RBC # FLD: 13.9 % — SIGNIFICANT CHANGE UP (ref 11–15.6)
RBC CASTS # UR COMP ASSIST: SIGNIFICANT CHANGE UP /HPF (ref 0–4)
SODIUM SERPL-SCNC: 140 MMOL/L — SIGNIFICANT CHANGE UP (ref 135–145)
SP GR SPEC: 1.01 — SIGNIFICANT CHANGE UP (ref 1.01–1.02)
TROPONIN T SERPL-MCNC: <0.01 NG/ML — SIGNIFICANT CHANGE UP (ref 0–0.06)
TROPONIN T SERPL-MCNC: <0.01 NG/ML — SIGNIFICANT CHANGE UP (ref 0–0.06)
TYPE + AB SCN PNL BLD: SIGNIFICANT CHANGE UP
UROBILINOGEN FLD QL: NEGATIVE MG/DL — SIGNIFICANT CHANGE UP
WBC # BLD: 9.4 K/UL — SIGNIFICANT CHANGE UP (ref 4.8–10.8)
WBC # FLD AUTO: 9.4 K/UL — SIGNIFICANT CHANGE UP (ref 4.8–10.8)
WBC UR QL: SIGNIFICANT CHANGE UP

## 2018-09-25 PROCEDURE — 99223 1ST HOSP IP/OBS HIGH 75: CPT | Mod: 25,57

## 2018-09-25 PROCEDURE — 99233 SBSQ HOSP IP/OBS HIGH 50: CPT

## 2018-09-25 PROCEDURE — 73200 CT UPPER EXTREMITY W/O DYE: CPT | Mod: 26,RT

## 2018-09-25 PROCEDURE — 11012 DEB SKIN BONE AT FX SITE: CPT | Mod: RT

## 2018-09-25 PROCEDURE — 24685 OPTX ULNAR FX PROX END W/FIX: CPT | Mod: RT

## 2018-09-25 PROCEDURE — 73070 X-RAY EXAM OF ELBOW: CPT | Mod: 26,RT

## 2018-09-25 PROCEDURE — 93010 ELECTROCARDIOGRAM REPORT: CPT

## 2018-09-25 PROCEDURE — 15271 SKIN SUB GRAFT TRNK/ARM/LEG: CPT | Mod: RT

## 2018-09-25 RX ORDER — OXYCODONE HYDROCHLORIDE 5 MG/1
2.5 TABLET ORAL
Qty: 0 | Refills: 0 | Status: DISCONTINUED | OUTPATIENT
Start: 2018-09-25 | End: 2018-09-28

## 2018-09-25 RX ORDER — ACETAMINOPHEN 500 MG
750 TABLET ORAL ONCE
Qty: 0 | Refills: 0 | Status: DISCONTINUED | OUTPATIENT
Start: 2018-09-25 | End: 2018-09-25

## 2018-09-25 RX ORDER — CEFAZOLIN SODIUM 1 G
2000 VIAL (EA) INJECTION EVERY 8 HOURS
Qty: 0 | Refills: 0 | Status: DISCONTINUED | OUTPATIENT
Start: 2018-09-25 | End: 2018-09-25

## 2018-09-25 RX ORDER — FERROUS SULFATE 325(65) MG
325 TABLET ORAL
Qty: 0 | Refills: 0 | Status: DISCONTINUED | OUTPATIENT
Start: 2018-09-26 | End: 2018-09-28

## 2018-09-25 RX ORDER — SODIUM CHLORIDE 9 MG/ML
1000 INJECTION, SOLUTION INTRAVENOUS
Qty: 0 | Refills: 0 | Status: DISCONTINUED | OUTPATIENT
Start: 2018-09-25 | End: 2018-09-26

## 2018-09-25 RX ORDER — POTASSIUM CHLORIDE 20 MEQ
40 PACKET (EA) ORAL EVERY 4 HOURS
Qty: 0 | Refills: 0 | Status: COMPLETED | OUTPATIENT
Start: 2018-09-25 | End: 2018-09-26

## 2018-09-25 RX ORDER — SODIUM CHLORIDE 9 MG/ML
1000 INJECTION INTRAMUSCULAR; INTRAVENOUS; SUBCUTANEOUS
Qty: 0 | Refills: 0 | Status: DISCONTINUED | OUTPATIENT
Start: 2018-09-26 | End: 2018-09-26

## 2018-09-25 RX ORDER — CEFTRIAXONE 500 MG/1
1 INJECTION, POWDER, FOR SOLUTION INTRAMUSCULAR; INTRAVENOUS ONCE
Qty: 0 | Refills: 0 | Status: COMPLETED | OUTPATIENT
Start: 2018-09-25 | End: 2018-09-25

## 2018-09-25 RX ORDER — DOCUSATE SODIUM 100 MG
100 CAPSULE ORAL THREE TIMES A DAY
Qty: 0 | Refills: 0 | Status: DISCONTINUED | OUTPATIENT
Start: 2018-09-26 | End: 2018-09-28

## 2018-09-25 RX ORDER — KETOROLAC TROMETHAMINE 30 MG/ML
15 SYRINGE (ML) INJECTION EVERY 8 HOURS
Qty: 0 | Refills: 0 | Status: DISCONTINUED | OUTPATIENT
Start: 2018-09-25 | End: 2018-09-25

## 2018-09-25 RX ORDER — POTASSIUM CHLORIDE 20 MEQ
40 PACKET (EA) ORAL EVERY 4 HOURS
Qty: 0 | Refills: 0 | Status: COMPLETED | OUTPATIENT
Start: 2018-09-25 | End: 2018-09-25

## 2018-09-25 RX ORDER — ONDANSETRON 8 MG/1
4 TABLET, FILM COATED ORAL EVERY 6 HOURS
Qty: 0 | Refills: 0 | Status: DISCONTINUED | OUTPATIENT
Start: 2018-09-26 | End: 2018-09-28

## 2018-09-25 RX ORDER — HYDRALAZINE HCL 50 MG
10 TABLET ORAL EVERY 8 HOURS
Qty: 0 | Refills: 0 | Status: DISCONTINUED | OUTPATIENT
Start: 2018-09-25 | End: 2018-09-25

## 2018-09-25 RX ORDER — MORPHINE SULFATE 50 MG/1
2 CAPSULE, EXTENDED RELEASE ORAL EVERY 4 HOURS
Qty: 0 | Refills: 0 | Status: DISCONTINUED | OUTPATIENT
Start: 2018-09-25 | End: 2018-09-25

## 2018-09-25 RX ORDER — ONDANSETRON 8 MG/1
4 TABLET, FILM COATED ORAL ONCE
Qty: 0 | Refills: 0 | Status: DISCONTINUED | OUTPATIENT
Start: 2018-09-25 | End: 2018-09-25

## 2018-09-25 RX ORDER — SODIUM CHLORIDE 9 MG/ML
1000 INJECTION, SOLUTION INTRAVENOUS
Qty: 0 | Refills: 0 | Status: DISCONTINUED | OUTPATIENT
Start: 2018-09-25 | End: 2018-09-25

## 2018-09-25 RX ORDER — FENTANYL CITRATE 50 UG/ML
25 INJECTION INTRAVENOUS
Qty: 0 | Refills: 0 | Status: DISCONTINUED | OUTPATIENT
Start: 2018-09-25 | End: 2018-09-25

## 2018-09-25 RX ORDER — ACETAMINOPHEN 500 MG
975 TABLET ORAL EVERY 8 HOURS
Qty: 0 | Refills: 0 | Status: DISCONTINUED | OUTPATIENT
Start: 2018-09-25 | End: 2018-09-28

## 2018-09-25 RX ORDER — OXYCODONE HYDROCHLORIDE 5 MG/1
5 TABLET ORAL
Qty: 0 | Refills: 0 | Status: DISCONTINUED | OUTPATIENT
Start: 2018-09-25 | End: 2018-09-26

## 2018-09-25 RX ORDER — ENOXAPARIN SODIUM 100 MG/ML
40 INJECTION SUBCUTANEOUS DAILY
Qty: 0 | Refills: 0 | Status: DISCONTINUED | OUTPATIENT
Start: 2018-09-26 | End: 2018-09-28

## 2018-09-25 RX ORDER — CEFAZOLIN SODIUM 1 G
2000 VIAL (EA) INJECTION EVERY 8 HOURS
Qty: 0 | Refills: 0 | Status: COMPLETED | OUTPATIENT
Start: 2018-09-25 | End: 2018-09-26

## 2018-09-25 RX ADMIN — SODIUM CHLORIDE 125 MILLILITER(S): 9 INJECTION, SOLUTION INTRAVENOUS at 21:50

## 2018-09-25 RX ADMIN — Medication 100 MILLIGRAM(S): at 05:51

## 2018-09-25 RX ADMIN — SODIUM CHLORIDE 125 MILLILITER(S): 9 INJECTION, SOLUTION INTRAVENOUS at 13:20

## 2018-09-25 RX ADMIN — CEFTRIAXONE 100 GRAM(S): 500 INJECTION, POWDER, FOR SOLUTION INTRAMUSCULAR; INTRAVENOUS at 17:44

## 2018-09-25 RX ADMIN — Medication 40 MILLIEQUIVALENT(S): at 21:51

## 2018-09-25 RX ADMIN — Medication 15 MILLIGRAM(S): at 13:23

## 2018-09-25 RX ADMIN — Medication 15 MILLIGRAM(S): at 13:22

## 2018-09-25 NOTE — CONSULT NOTE ADULT - SUBJECTIVE AND OBJECTIVE BOX
Pt Name: JO ROBERTS    MRN: [id number]      Patient is a 87y Female presenting to the emergency department with a chief complaint of right elbow pain s/p mechanical fall x 4 hours ago. Pt states that she was walking up stairs when she tripped and fell down 2 stairs. Pt otherwise denies LOC, h/a, c/p, sob, abdominal pain, n/v, numbness/tingling and has no other complaints.      REVIEW OF SYSTEMS    General: Alert, responsive, in NAD    Skin/Breast: No rashes, no pruritis   	  Ophthalmologic: No visual changes. No redness.   	  ENMT:	No discharge. No swelling.    Respiratory and Thorax: No difficulty breathing. No cough.  	   Cardiovascular:	No chest pain. No palpitations.    Gastrointestinal:	 No abdominal pain. No diarrhea.     Genitourinary: No dysuria. No bleeding.    Musculoskeletal: SEE HPI.    Neurological: No sensory or motor changes.     Psychiatric: No anxiety or depression.    Hematology/Lymphatics: No swelling.    Endocrine: No Hx of diabetes.    ROS is otherwise negative.    PAST MEDICAL & SURGICAL HISTORY:  PAST MEDICAL & SURGICAL HISTORY:  Aortic valve stenosis, etiology of cardiac valve disease unspecified  Depression, unspecified depression type  Essential hypertension  HLD (hyperlipidemia)  H/O hysterectomy for benign disease      Allergies: No Known Allergies      Medications: ondansetron Injectable 4 milliGRAM(s) IV Push every 6 hours PRN      FAMILY HISTORY:  Family history of hypertension (Father, Mother)  : non-contributory    Social History: Denies ETOH abuse.    Ambulation: Walking independently [ ] With Cane [ ] With Walker [ x ]  Bedbound [ ]                           11.0   12.7  )-----------( 267      ( 24 Sep 2018 22:10 )             34.7       09-24    139  |  99  |  23.0<H>  ----------------------------<  149<H>  3.5   |  25.0  |  0.87    Ca    10.0      24 Sep 2018 22:10    TPro  8.0  /  Alb  4.4  /  TBili  1.4  /  DBili  x   /  AST  23  /  ALT  12  /  AlkPhos  107  09-24      Vital Signs Last 24 Hrs  T(C): 37.2 (24 Sep 2018 19:39), Max: 37.2 (24 Sep 2018 19:39)  T(F): 98.9 (24 Sep 2018 19:39), Max: 98.9 (24 Sep 2018 19:39)  HR: 47 (24 Sep 2018 23:22) (44 - 54)  BP: 170/75 (24 Sep 2018 23:22) (139/64 - 179/78)  BP(mean): --  RR: 18 (24 Sep 2018 23:22) (18 - 20)  SpO2: 100% (24 Sep 2018 23:22) (95% - 100%)    Daily Height in cm: 165.1 (24 Sep 2018 19:08)    Daily       PHYSICAL EXAM:      Appearance: Alert, responsive, in no acute distress.    Neurological: Sensation is grossly intact to light touch.    Skin: +0.5cm puncture wound of the right lateral elbow with mild active bleeding noted. +ecchymosis/hematoma of the right elbow.    Vascular: 2+ distal radial pulses. Cap refill < 2 sec. No signs of venous insufficiency or stasis. No extremity ulcerations. No cyanosis.    Musculoskeletal:         Left Upper Extremity:  + NROM. Non-tender. No signs of trauma.        Right Upper Extremity: +TTP of the right elbow with decreased ROM due to reported pain/injury. FROM of shoulder/wrist with no TTP noted. +abduction/adduction/flexion/extension of all digits. Compartments soft and compressible.        Left Lower Extremity:  + NROM. Non-tender. No signs of trauma.        Right Lower Extremity:  + NROM. Non-tender. No signs of trauma.     Imaging Studies: All images reviewed with Dr. Damon.    Prcoedure: FRACTURE REDUCTION  PROCEDURE NOTE: Fracture reduction     Performed by:  Aristeo Vieira PA-C    Indication: Acute fracture with displacement, requiring fracture reduction.    Consent: The risks and benefits of the procedure including incomplete reduction, nerve damage and bleeding were explained and the patient verbalized their understanding and wished to proceed with the procedure. Written consent was obtained following the discussion.    Universal Protocol: a time out was performed and the correct patient and site were verified     Procedure: Neurovascular exam intact prior to fracture reduction.  Skin exam : + 0.5cm puncture wound of the right lateral elbow with mild active bleeding at the fracture site. Reduction of the right elbow was accomplished via axial traction and careful manipulation. Following adequate reduction and alignment of the fractured bone, the fracture was immobilized with a  plaster splint. Distally, the extremity was neurovascular intact following the procedure.  The patient tolerated the procedure well.    Post reduction films obtained and demonstrated an adequate reduction.    Complications: None     A/P:  Pt is a  87y Female with am OPEN fracture/dislocation of the right elbow s/p mechanical fall.    PLAN d/w Dr. Damon:   * NPO for OR tomorrow  * IV fluids ordered and to start once NPO  * Pre-operative ABX ordered  * Routine daily anticoagulation held for OR  * Medical clearance requested for procedure  * Maintain splint/sling at all times  * Bed rest Pt Name: JO ROBERTS    MRN: [id number]      Patient is a 87y Female presenting to the emergency department with a chief complaint of right elbow pain s/p mechanical fall x 4 hours ago. Pt states that she was walking up stairs when she tripped and fell down 2 stairs. Pt otherwise denies LOC, h/a, c/p, sob, abdominal pain, n/v, numbness/tingling and has no other complaints.      REVIEW OF SYSTEMS    General: Alert, responsive, in NAD    Skin/Breast: No rashes, no pruritis   	  Ophthalmologic: No visual changes. No redness.   	  ENMT:	No discharge. No swelling.    Respiratory and Thorax: No difficulty breathing. No cough.  	   Cardiovascular:	No chest pain. No palpitations.    Gastrointestinal:	 No abdominal pain. No diarrhea.     Genitourinary: No dysuria. No bleeding.    Musculoskeletal: SEE HPI.    Neurological: No sensory or motor changes.     Psychiatric: No anxiety or depression.    Hematology/Lymphatics: No swelling.    Endocrine: No Hx of diabetes.    ROS is otherwise negative.    PAST MEDICAL & SURGICAL HISTORY:  PAST MEDICAL & SURGICAL HISTORY:  Aortic valve stenosis, etiology of cardiac valve disease unspecified  Depression, unspecified depression type  Essential hypertension  HLD (hyperlipidemia)  H/O hysterectomy for benign disease      Allergies: No Known Allergies      Medications: ondansetron Injectable 4 milliGRAM(s) IV Push every 6 hours PRN      FAMILY HISTORY:  Family history of hypertension (Father, Mother)  : non-contributory    Social History: Denies ETOH abuse.    Ambulation: Walking independently [ ] With Cane [ ] With Walker [ x ]  Bedbound [ ]                           11.0   12.7  )-----------( 267      ( 24 Sep 2018 22:10 )             34.7       09-24    139  |  99  |  23.0<H>  ----------------------------<  149<H>  3.5   |  25.0  |  0.87    Ca    10.0      24 Sep 2018 22:10    TPro  8.0  /  Alb  4.4  /  TBili  1.4  /  DBili  x   /  AST  23  /  ALT  12  /  AlkPhos  107  09-24      Vital Signs Last 24 Hrs  T(C): 37.2 (24 Sep 2018 19:39), Max: 37.2 (24 Sep 2018 19:39)  T(F): 98.9 (24 Sep 2018 19:39), Max: 98.9 (24 Sep 2018 19:39)  HR: 47 (24 Sep 2018 23:22) (44 - 54)  BP: 170/75 (24 Sep 2018 23:22) (139/64 - 179/78)  BP(mean): --  RR: 18 (24 Sep 2018 23:22) (18 - 20)  SpO2: 100% (24 Sep 2018 23:22) (95% - 100%)    Daily Height in cm: 165.1 (24 Sep 2018 19:08)    Daily       PHYSICAL EXAM:      Appearance: Alert, responsive, in no acute distress.    Neurological: Sensation is grossly intact to light touch.    Skin: +0.5cm puncture wound of the right lateral elbow with mild active bleeding noted. +ecchymosis/hematoma of the right elbow.    Vascular: 2+ distal radial pulses. Cap refill < 2 sec. No signs of venous insufficiency or stasis. No extremity ulcerations. No cyanosis.    Musculoskeletal:         Left Upper Extremity:  + NROM. Non-tender. No signs of trauma.        Right Upper Extremity: +TTP of the right elbow with decreased ROM due to reported pain/injury. FROM of shoulder/wrist with no TTP noted. +abduction/adduction/flexion/extension of all digits. Compartments soft and compressible.        Left Lower Extremity:  + NROM. Non-tender. No signs of trauma.        Right Lower Extremity:  + NROM. Non-tender. No signs of trauma.     Imaging Studies: All images reviewed with Dr. Damon.    Prcoedure: FRACTURE REDUCTION  PROCEDURE NOTE: Fracture reduction     Performed by:  Aristeo Vieira PA-C    Indication: Acute fracture with displacement, requiring fracture reduction.    Consent: The risks and benefits of the procedure including incomplete reduction, nerve damage and bleeding were explained and the patient verbalized their understanding and wished to proceed with the procedure. Written consent was obtained following the discussion.    Universal Protocol: a time out was performed and the correct patient and site were verified     Procedure: Neurovascular exam intact prior to fracture reduction.  Skin exam : + 0.5cm puncture wound of the right lateral elbow with mild active bleeding at the fracture site. Reduction of the right elbow was accomplished via axial traction and careful manipulation. Following adequate reduction and alignment of the fractured bone, the fracture was immobilized with a  plaster splint. Distally, the extremity was neurovascular intact following the procedure.  The patient tolerated the procedure well.    Post reduction films obtained and demonstrated an adequate reduction.    Complications: None     A/P:  Pt is a  87y Female with am OPEN fracture/dislocation of the right elbow s/p mechanical fall.     PLAN d/w Dr. Damon:   * NPO for OR tomorrow- this procedure is considered EMERGENT and patient must go to the OR first thing in the morning for wash out and open reduction internal fixation with Dr. Damon to avoid serious risks of infection. Therefore any pending testing must be put on hold until after surgery.  * IV fluids ordered and to start once NPO  * Pre-operative ABX ordered  * Routine daily anticoagulation held for OR  * Medical clearance requested for procedure  * Maintain splint/sling at all times  * Bed rest Pt Name: JO ROBERTS    MRN: [id number]      Patient is a 87y Female presenting to the emergency department with a chief complaint of right elbow pain s/p mechanical fall x 4 hours ago. Pt states that she was walking up stairs when she tripped and fell down 2 stairs. Pt otherwise denies LOC, h/a, c/p, sob, abdominal pain, n/v, numbness/tingling and has no other complaints.      REVIEW OF SYSTEMS    General: Alert, responsive, in NAD    Skin/Breast: No rashes, no pruritis   	  Ophthalmologic: No visual changes. No redness.   	  ENMT:	No discharge. No swelling.    Respiratory and Thorax: No difficulty breathing. No cough.  	   Cardiovascular:	No chest pain. No palpitations.    Gastrointestinal:	 No abdominal pain. No diarrhea.     Genitourinary: No dysuria. No bleeding.    Musculoskeletal: SEE HPI.    Neurological: No sensory or motor changes.     Psychiatric: No anxiety or depression.    Hematology/Lymphatics: No swelling.    Endocrine: No Hx of diabetes.    ROS is otherwise negative.    PAST MEDICAL & SURGICAL HISTORY:  PAST MEDICAL & SURGICAL HISTORY:  Aortic valve stenosis, etiology of cardiac valve disease unspecified  Depression, unspecified depression type  Essential hypertension  HLD (hyperlipidemia)  H/O hysterectomy for benign disease      Allergies: No Known Allergies      Medications: ondansetron Injectable 4 milliGRAM(s) IV Push every 6 hours PRN      FAMILY HISTORY:  Family history of hypertension (Father, Mother)  : non-contributory    Social History: Denies ETOH abuse.    Ambulation: Walking independently [ ] With Cane [ ] With Walker [ x ]  Bedbound [ ]                           11.0   12.7  )-----------( 267      ( 24 Sep 2018 22:10 )             34.7       09-24    139  |  99  |  23.0<H>  ----------------------------<  149<H>  3.5   |  25.0  |  0.87    Ca    10.0      24 Sep 2018 22:10    TPro  8.0  /  Alb  4.4  /  TBili  1.4  /  DBili  x   /  AST  23  /  ALT  12  /  AlkPhos  107  09-24      Vital Signs Last 24 Hrs  T(C): 37.2 (24 Sep 2018 19:39), Max: 37.2 (24 Sep 2018 19:39)  T(F): 98.9 (24 Sep 2018 19:39), Max: 98.9 (24 Sep 2018 19:39)  HR: 47 (24 Sep 2018 23:22) (44 - 54)  BP: 170/75 (24 Sep 2018 23:22) (139/64 - 179/78)  BP(mean): --  RR: 18 (24 Sep 2018 23:22) (18 - 20)  SpO2: 100% (24 Sep 2018 23:22) (95% - 100%)    Daily Height in cm: 165.1 (24 Sep 2018 19:08)    Daily       PHYSICAL EXAM:      Appearance: Alert, responsive, in no acute distress.    Neurological: Sensation is grossly intact to light touch.    Skin: +0.5cm puncture wound of the right lateral elbow with mild active bleeding noted. +ecchymosis/hematoma of the right elbow.    Vascular: 2+ distal radial pulses. Cap refill < 2 sec. No signs of venous insufficiency or stasis. No extremity ulcerations. No cyanosis.    Musculoskeletal:         Left Upper Extremity:  + NROM. Non-tender. No signs of trauma.        Right Upper Extremity: +TTP of the right elbow with decreased ROM due to reported pain/injury. FROM of shoulder/wrist with no TTP noted. +abduction/adduction/flexion/extension of all digits. Compartments soft and compressible.        Left Lower Extremity:  + NROM. Non-tender. No signs of trauma.        Right Lower Extremity:  + NROM. Non-tender. No signs of trauma.     Imaging Studies: All images reviewed with Dr. Damon.    Prcoedure: FRACTURE REDUCTION  PROCEDURE NOTE: Fracture reduction     Performed by:  Aristeo Vieira PA-C    Indication: Acute fracture with displacement, requiring fracture reduction.    Consent: The risks and benefits of the procedure including incomplete reduction, nerve damage and bleeding were explained and the patient verbalized their understanding and wished to proceed with the procedure. Written consent was obtained following the discussion.    Universal Protocol: a time out was performed and the correct patient and site were verified     Procedure: Neurovascular exam intact prior to fracture reduction.  Skin exam : + 0.5cm puncture wound of the right lateral elbow with mild active bleeding at the fracture site. Wound was copiously irrigated with 3L of saline/betadine mixture Reduction of the right elbow was accomplished via axial traction and careful manipulation. Following adequate reduction and alignment of the fractured bone, the fracture was immobilized with a  plaster splint. Distally, the extremity was neurovascular intact following the procedure.  The patient tolerated the procedure well.    Post reduction films obtained and demonstrated an adequate reduction.    Complications: None     A/P:  Pt is a  87y Female with am OPEN fracture/dislocation of the right elbow s/p mechanical fall.     PLAN d/w Dr. Damon:   * NPO for OR tomorrow- this procedure is considered EMERGENT and patient must go to the OR first thing in the morning for wash out and open reduction internal fixation with Dr. Damon to avoid serious risks of infection. Therefore any pending testing must be put on hold until after surgery.  * IV fluids ordered and to start once NPO  * Pre-operative ABX ordered  * Routine daily anticoagulation held for OR  * Medical clearance requested for procedure  * Maintain splint/sling at all times  * Bed rest

## 2018-09-25 NOTE — CONSULT NOTE ADULT - PROBLEM SELECTOR RECOMMENDATION 3
Low Na diet, VS every 6 hours, may continue HCTZ but avoid Atenolol due to bradycardia episodes , Low Na diet, VS every 6 hours, may continue HCTZ but avoid Atenolol due to bradycardia episodes,

## 2018-09-25 NOTE — CONSULT NOTE ADULT - ATTENDING COMMENTS
Ortho Trauma Attending:  Agree with above PA note.  Note edited where necessary.  Patient will need to go to the OR at 730 for emergent fixation of her open fracture. We will attempt to contact family for consent. Ortho will continue to follow.    Tod Damon MD  Orthopaedic Trauma Surgery
Patient went to OR.  I did not see the patient.

## 2018-09-25 NOTE — PROGRESS NOTE ADULT - SUBJECTIVE AND OBJECTIVE BOX
Orthopedic PA Postop Note  Patient S/P RIGHT OLECRANON WASH OUT AND ORIF  Patient in bed comfortable   RIGHT UPPER EXTREMITY  Dressing C/D/I - ACE wrap without staining  sling in the appropriate positioning  posterior splint of the RUE in the appropriate positioning  radial Pulse intact   compartments soft and compressible  Dorsi/Plantar Flexion/EHL/FHL intact   Sensation intact to light touch    Vital Signs Last 24 Hrs  T(C): 36.8 (25 Sep 2018 15:36), Max: 37 (25 Sep 2018 07:08)  T(F): 98.2 (25 Sep 2018 15:36), Max: 98.6 (25 Sep 2018 07:08)  HR: 49 (25 Sep 2018 16:12) (44 - 59)  BP: 98/60 (25 Sep 2018 16:12) (96/42 - 179/78)  BP(mean): --  RR: 16 (25 Sep 2018 16:12) (12 - 18)  SpO2: 96% (25 Sep 2018 15:36) (95% - 100%)    < from: Xray Elbow AP + Lateral, Right (09.25.18 @ 12:34) >   EXAM:  ELBOW 2VIEWS RT                          PROCEDURE DATE:  09/25/2018          INTERPRETATION:  CLINICAL HISTORY: Status post ORIF TECHNIQUE: AP and   lateral views of the RIGHT elbow  were obtained.    FINDINGS: Following operative reduction of the fragmented olecranon has   been transfixed by plate-screw fixation device, fracture segments in   proper anatomic alignment. The radial head is aligned with the   capitellum. There is joint effusion elevation of the anterior fat pad.   Splintapplied.   IMPRESSION: Status post ORIF as described.                SCHUYLER PEREZ M.D., ATTENDING RADIOLOGIST  This document has been electronically signed. Sep 25 2018 12:52PM    < end of copied text >      A/P: 87F S/P RIGHT OLECRANON I & D / ORIF  1. DVTP - LOVENOX  2. Physical Therapy   3. Pain Control as clinically indicated Orthopedic PA Postop Note  Patient S/P RIGHT OLECRANON WASH OUT AND ORIF  Patient in bed comfortable   RIGHT UPPER EXTREMITY  Dressing C/D/I - ACE wrap without staining  sling in the appropriate positioning  RIOS intact and functioning  posterior splint of the RUE in the appropriate positioning  radial Pulse intact   compartments soft and compressible  Dorsi/Plantar Flexion/EHL/FHL intact   Sensation intact to light touch    Vital Signs Last 24 Hrs  T(C): 36.8 (25 Sep 2018 15:36), Max: 37 (25 Sep 2018 07:08)  T(F): 98.2 (25 Sep 2018 15:36), Max: 98.6 (25 Sep 2018 07:08)  HR: 49 (25 Sep 2018 16:12) (44 - 59)  BP: 98/60 (25 Sep 2018 16:12) (96/42 - 179/78)  BP(mean): --  RR: 16 (25 Sep 2018 16:12) (12 - 18)  SpO2: 96% (25 Sep 2018 15:36) (95% - 100%)    < from: Xray Elbow AP + Lateral, Right (09.25.18 @ 12:34) >   EXAM:  ELBOW 2VIEWS RT                          PROCEDURE DATE:  09/25/2018          INTERPRETATION:  CLINICAL HISTORY: Status post ORIF TECHNIQUE: AP and   lateral views of the RIGHT elbow  were obtained.    FINDINGS: Following operative reduction of the fragmented olecranon has   been transfixed by plate-screw fixation device, fracture segments in   proper anatomic alignment. The radial head is aligned with the   capitellum. There is joint effusion elevation of the anterior fat pad.   Splintapplied.   IMPRESSION: Status post ORIF as described.                SCHUYLER PEREZ M.D., ATTENDING RADIOLOGIST  This document has been electronically signed. Sep 25 2018 12:52PM    < end of copied text >      A/P: 87F S/P RIGHT OLECRANON I & D / ORIF  1. DVTP - LOVENOX  2. Physical Therapy   3. Pain Control as clinically indicated

## 2018-09-25 NOTE — CONSULT NOTE ADULT - SUBJECTIVE AND OBJECTIVE BOX
[place] [facility]                                                        Office: [address] [address]                                                       Telephone: [phone]. Fax:[phone]                                                              CARDIOLOGY CONSULTATION NOTE                                                                                             Consult requested by:      Reason for Consultation:     History obtained by: Patient and medical record     obtained: No    Chief complaint:    Patient is a 87y old  Female who presents with a chief complaint of R elbow fx (25 Sep 2018 00:06)      HPI:  Pt is a 88 yo F presenting to the ED after mechanical fall on 9/23/18 resulting in R elbow fx. PMH HTN, HLD, Depression, Dementia, Aortic Stenosis.   Patient received conscious sedation and difficult to obtain history as she was still drowsy. History primarily obtained from medical record, discussion w/ sons at bedside.   Patient was walking up the stairs in her home yesterday with a blanket in her hands and tripped on the blanket falling down approximately 2 stairs. She  landed on her hands as per family, did not lose consciousness, no pre-syncopal symptoms, no preceeding light-headedness, dizziness, palpitations as per family (patient states same) and she had a fall. She went to get XR performed and was then sent to ED by ortho. She received conscious sedation so ROS is limited and provided by family members (sons).   Patient is functionally active, no hx of MI/stroke, medical problems as stated above. She has no know hx of adverse rxn to anesthesia. Had hysterectomy when she was younger and back surgery. She does not follow with a cardiologist.     In ED patient had Xray performed which showed open R elbow fx, received consious sedation and had reduction in ED w/ ER attending and Ortho PA. Labs unremarkable. (24 Sep 2018 23:24)        REVIEW OF SYMPTOMS: Cardiovascular:  See HPI. No chest pain,  No dyspnea,  No syncope,  No palpitations, No dizziness, No Orthopnea,      No Paroxsymal nocturnal dyspnea;  Respiratory:  No Dyspnea, No cough,     Genitourinary:  No dysuria, no hematuria; Gastrointestinal:  No nausea, no vomiting. No diarrhea.  No abdominal pain. No dark color stool, no melena ; Neurological: No headache, no dizziness, no slurred speech;  Psychiatric: No agitation, no anxiety.  ALL OTHER REVIEW OF SYSTEMS ARE NEGATIVE.    ALLERGIES: Allergies    No Known Allergies    Intolerances          CURRENT MEDICATIONS:  hydrALAZINE Injectable 10 milliGRAM(s) IV Push every 8 hours PRN     ceFAZolin   IVPB      HOME MEDICATIONS:    PAST MEDICAL HISTORY  Aortic valve stenosis, etiology of cardiac valve disease unspecified  Depression, unspecified depression type  Essential hypertension  HLD (hyperlipidemia)      PAST SURGICAL HISTORY  H/O hysterectomy for benign disease  No significant past surgical history      FAMILY HISTORY:  Family history of hypertension (Father, Mother)      SOCIAL HISTORY:  Denies smoking/alcohol/drugs    CIGARETTES:       ALCOHOL:    DRUGS:    Vital Signs Last 24 Hrs  T(C): 37.2 (24 Sep 2018 19:39), Max: 37.2 (24 Sep 2018 19:39)  T(F): 98.9 (24 Sep 2018 19:39), Max: 98.9 (24 Sep 2018 19:39)  HR: 47 (24 Sep 2018 23:22) (44 - 54)  BP: 170/75 (24 Sep 2018 23:22) (139/64 - 179/78)  BP(mean): --  RR: 18 (24 Sep 2018 23:22) (18 - 20)  SpO2: 100% (24 Sep 2018 23:22) (95% - 100%)      PHYSICAL EXAM:  Constitutional: Comfortable . No acute distress.   HEENT: Atraumatic and normcephalic , neck is supple . no JVD. No carotid bruit. PEERL   CNS: A&Ox3. No focal deficits. EOMI. Cranial nerves II-IX are intact.   Lymph Nodes: Cervical : Not palpable.  Respiratory: CTAB  Cardiovascular: S1S2 RRR. No murmur/rubs or gallop.  Gastrointestinal: Soft non-tender and non distended . +Bowel sounds. negative Xie's sign.  Extremities: No edema.   Psychiatric: Calm . no agitation.  Skin: No skin rash/ulcers visualized to face, hands or feet.    Intake and output:     LABS:                        11.0   12.7  )-----------( 267      ( 24 Sep 2018 22:10 )             34.7     09-24    139  |  99  |  23.0<H>  ----------------------------<  149<H>  3.5   |  25.0  |  0.87    Ca    10.0      24 Sep 2018 22:10    TPro  8.0  /  Alb  4.4  /  TBili  1.4  /  DBili  x   /  AST  23  /  ALT  12  /  AlkPhos  107  09-24    ;p-BNP=  PT/INR - ( 24 Sep 2018 22:10 )   PT: 12.6 sec;   INR: 1.14 ratio      PTT - ( 24 Sep 2018 22:10 )  PTT:26.4 sec    ECG: Reviewed by me.     RADIOLOGY & ADDITIONAL STUDIES:   X-ray:  Right elbow fracture   CT scan:      ECHO FINDINGS: Date:                : LVEF=          ; RV function:       ; Valvular abnormalities: No significant valvular abnormality.  Mitral valve:           ;  Aortic valve:              ;Tricuspid valve:         ; Pulmonary pressures:        mm Hg. Pericardium:      STRESS  FINDINGS: Date:            ;      CATHETERIZATION FINDINGS:  Date:              :  LAD:                       ;  LCx:                        ; RCA:                ; [place] [facility]                                                        Office: [address] [address]                                                       Telephone: [phone]. Fax:[phone]                                                              CARDIOLOGY CONSULTATION NOTE                                                                                             Chief complaint: R elbow fx (25 Sep 2018 00:06)      HPI:  Patient is an 88 yo F BIBA after she experienced a mechanical fall on yesterday at home.  History obtained primarily from son who is by patient's bedside and medical records since patient is asleep.  Patient with mild cognitive decline, "received conscious sedation" during right elbow reduction in [facility].  Son states that patient was walking up the stairs in her home yesterday with a blanket in her hands.  Stepped on the blanket, tripped and fell down.  She landed on her hands.  The other son who she lives with, did not make muck of the fall, however, mom c/o right elbow pain so he took her to an Urgent Care center.  From there she was sent in [facility] for further evaluation and management.  Son states that mom did not lose consciousness, no pre-syncopal symptoms.  No dizziness, palpitations, CP, HA prior to fall.  In the [facility] patient underwent Right elbow reduction and cast placed by Ortho team.   "Patient is functionally active, no hx of MI/stroke, medical problems as stated above. She has no know hx of adverse rxn to anesthesia. Had hysterectomy when she was younger and back surgery. She does not follow with a cardiologist."      REVIEW OF SYMPTOMS:   General: Roderick and asleep no distress  Cardiovascular: denies chest pain, dyspnea,  syncope,  palpitations, dizziness, orthopnea, Paroxsymal nocturnal dyspnea;    Respiratory:  No Dyspnea, No cough,     Genitourinary:  denies dysuria, no hematuria;   Gastrointestinal:  denies nausea, vomiting, diarrhea, abdominal pain  Neurological: denies headache, dizziness, slurred speech or LOC;  Psychiatric: No agitation, no anxiety.  ALL OTHER REVIEW OF SYSTEMS ARE NEGATIVE.    ALLERGIES: No Known Allergies    CURRENT MEDICATIONS:  Atorvastatin: Last Dose Taken:    Atenolol: Last Dose Taken:    HydroCHLOROthiazide: Last Dose Taken:    Sertraline: Last Dose Taken    PAST MEDICAL HISTORY  Aortic valve stenosis, etiology of cardiac valve disease unspecified  Depression, unspecified depression type  Essential hypertension  HLD (hyperlipidemia)    PAST SURGICAL HISTORY  H/O hysterectomy for benign disease  No significant past surgical history    FAMILY HISTORY:  Family history of hypertension (Father, Mother)    SOCIAL HISTORY:  Denies smoking/alcohol/drugs    Vital Signs Last 24 Hrs  T(C): 37.2 (24 Sep 2018 19:39), Max: 37.2 (24 Sep 2018 19:39)  T(F): 98.9 (24 Sep 2018 19:39), Max: 98.9 (24 Sep 2018 19:39)  HR: 47 (24 Sep 2018 23:22) (44 - 54)  BP: 170/75 (24 Sep 2018 23:22) (139/64 - 179/78)  BP(mean): --  RR: 18 (24 Sep 2018 23:22) (18 - 20)  SpO2: 100% (24 Sep 2018 23:22) (95% - 100%)      PHYSICAL EXAM:  Constitutional: Comfortable and no acute distress.   HEENT: Atraumatic, neck is supple, no JVD   CNS: A & O x3, No focal deficits, Cranial nerves II-IX are intact.   Respiratory: Poor insiratory effort, otherwise CTA b/l  Cardiovascular: S1S2 RRR. with + harsh III/VI JAVI at upper and middle left sternal border  Gastrointestinal: Soft non-tender and non distended, +Bowel sounds  Extremities: No edema. RUE + cast in place  Psychiatric: Calm no agitation.  Skin: No skin rash/ulcers visualized to face, hands or feet.    Intake and output:     LABS:                        11.0   12.7  )-----------( 267      ( 24 Sep 2018 22:10 )             34.7     09-24    139  |  99  |  23.0<H>  ----------------------------<  149<H>  3.5   |  25.0  |  0.87    Ca    10.0      24 Sep 2018 22:10    TPro  8.0  /  Alb  4.4  /  TBili  1.4  /  DBili  x   /  AST  23  /  ALT  12  /  AlkPhos  107  09-24    ;p-BNP=  PT/INR - ( 24 Sep 2018 22:10 )   PT: 12.6 sec;   INR: 1.14 ratio      PTT - ( 24 Sep 2018 22:10 )  PTT:26.4 sec    ECG: Sinus nicole @ 47 bpm with LVH, Qs in V1-V2  RADIOLOGY & ADDITIONAL STUDIES:   X-ray:  Right elbow fracture

## 2018-09-25 NOTE — DISCHARGE NOTE ADULT - MEDICATION SUMMARY - MEDICATIONS TO TAKE
I will START or STAY ON the medications listed below when I get home from the hospital:    Aspirin Enteric Coated 81 mg oral delayed release tablet  -- 1 tab(s) by mouth once a day   -- Swallow whole.  Do not crush.  Take with food or milk.    -- Indication: For Prophylactic measure    acetaminophen 325 mg oral tablet  -- 3 tab(s) by mouth every 8 hours  -- Indication: For Pain    oxyCODONE  -- 2.5 milligram(s) by mouth every 8 hours, As Needed severe pain  -- Indication: For Pain    lisinopril 5 mg oral tablet  -- 1 tab(s) by mouth once a day  -- Indication: For Htn    sertraline 50 mg oral tablet  -- 1.5 tab(s) by mouth once a day  -- Indication: For Depression, unspecified depression type    meclizine 12.5 mg oral tablet  -- 1 tab(s) by mouth 2 times a day, As Needed - for dizziness  -- Indication: For Dizziness    atorvastatin 20 mg oral tablet  -- 1 tab(s) by mouth once a day (at bedtime)   -- Avoid grapefruit and grapefruit juice while taking this medication.  Do not take this drug if you are pregnant.  It is very important that you take or use this exactly as directed.  Do not skip doses or discontinue unless directed by your doctor.  Obtain medical advice before taking any non-prescription drugs as some may affect the action of this medication.  Take with food or milk.    -- Indication: For Hyperlipidemia, unspecified hyperlipidemia type    QUEtiapine 25 mg oral tablet  -- 1 tab(s) by mouth once a day (at bedtime)  -- Indication: For Agitation    metoprolol tartrate 25 mg oral tablet  -- 1 tab(s) by mouth 2 times a day MDD:2 tabs  -- Indication: For Essential hypertension    Cepacol Extra Strength Honey Lemon 10 mg mucous membrane lozenge  -- 1 dose(s) by mouth 4 times a day, As Needed sore throat  -- Indication: For sore throat    ferrous sulfate 325 mg (65 mg elemental iron) oral tablet  -- 1 tab(s) by mouth once a day  -- Indication: For iron deficiency    docusate sodium 100 mg oral capsule  -- 1 cap(s) by mouth 3 times a day  -- Indication: For constipation    magnesium oxide 400 mg (241.3 mg elemental magnesium) oral tablet  -- 1 tab(s) by mouth 2 times a day (with meals)  -- Indication: For supplementation    Multiple Vitamins oral tablet  -- 1 tab(s) by mouth once a day  -- Indication: For supplementation

## 2018-09-25 NOTE — BRIEF OPERATIVE NOTE - PROCEDURE
<<-----Click on this checkbox to enter Procedure ORIF fracture of right olecranon  09/25/2018  with I&D of open fracture  Active  TMULRY

## 2018-09-25 NOTE — DISCHARGE NOTE ADULT - CARE PROVIDER_API CALL
Tod Damon), Orthopaedic Surgery  [address] [place] [address]  Phone: [phone]  Fax: [phone]

## 2018-09-25 NOTE — PROGRESS NOTE ADULT - SUBJECTIVE AND OBJECTIVE BOX
is an 88 yo F presenting to the ED after mechanical fall on 9/23/18 resulting in R elbow fx. PMH HTN, HLD, Depression, Dementia, Aortic Stenosis.   Patient received conscious sedation and difficult to obtain history as she was still drowsy. According th H&P, she was walking up the stairs in her home yesterday with a blanket in her hands and tripped on the blanket falling down approximately 2 stairs. She  landed on her hands as per family, did not lose consciousness, no pre-syncopal symptoms, no preceeding light-headedness, dizziness, palpitations as per family (patient states same) and she had a fall.     She is post-op elbow fixation and very drowsy from anesthesia. I'm concerned about possible UTI versus symptomatic bradycardia. She's going to be on monitor, but I've ordered u/a and urine culture with IVF and a dose of rocephin.    Summary:   REVIEW OF SYSTEMS    General:	unobtainable, drowsy    Skin/Breast:  	  Ophthalmologic:  	  ENMT:	    Respiratory and Thorax:  	  Cardiovascular:	    Gastrointestinal:	    Genitourinary:	    Musculoskeletal:	    Neurological:	    Psychiatric:	    Hematology/Lymphatics:	    Endocrine:	    Allergic/Immunologic:	  Vital Signs Last 24 Hrs  T(C): 36.7 (25 Sep 2018 13:45), Max: 37.2 (24 Sep 2018 19:39)  T(F): 98 (25 Sep 2018 13:45), Max: 98.9 (24 Sep 2018 19:39)  HR: 48 (25 Sep 2018 13:45) (44 - 59)  BP: 121/49 (25 Sep 2018 13:45) (96/42 - 179/78)  BP(mean): --  RR: 16 (25 Sep 2018 13:45) (12 - 20)  SpO2: 96% (25 Sep 2018 13:45) (95% - 100%)  Physical Exam:   GENERAL: frail elderly female, , responds to verbal stimuli, arousable  HEENT: head NC/AT; EOM intact, PERRLA, conjunctiva & sclera clear; moist mucous membranes  NECK: supple, no JVD  RESPIRATORY: CTA B/L, no wheezing, rales, rhonchi or rubs  CARDIOVASCULAR: S1&S2, RRR, +4/6 JAVI RUSB w/ radiation to carotids  ABDOMEN: soft, non-tender, non-distended, + Bowel sounds x4 quadrants, no guarding, rebound or rigidity  MUSCULOSKELETAL:  no clubbing, cyanosis or edema of LE. RUE in splint s/p reduction/splinting  LYMPH: no cervical lymphadenopathy  VASCULAR: Radial pulses 2+ bilaterally, no varicose veins   SKIN: warm and dry, color normal  NEUROLOGIC: alert, responsive to verbal and tactile stimuli, MAEx4, CN 2-12 intact, 5/5 LLE/RLE, LUE muslce strength, normal finger  b/l UE  Psych: Normal mood and affect, normal behavior                        9.7    9.4   )-----------( 237      ( 25 Sep 2018 06:55 )             30.4     09-25    140  |  102  |  21.0<H>  ----------------------------<  114  3.1<L>   |  24.0  |  0.83    Ca    9.4      25 Sep 2018 06:55    TPro  8.0  /  Alb  4.4  /  TBili  1.4  /  DBili  x   /  AST  23  /  ALT  12  /  AlkPhos  107  09-24    LIVER FUNCTIONS - ( 24 Sep 2018 22:10 )  Alb: 4.4 g/dL / Pro: 8.0 g/dL / ALK PHOS: 107 U/L / ALT: 12 U/L / AST: 23 U/L / GGT: x           PT/INR - ( 24 Sep 2018 22:10 )   PT: 12.6 sec;   INR: 1.14 ratio         PTT - ( 24 Sep 2018 22:10 )  PTT:26.4 sec    Radiology:     MEDICATIONS  (STANDING):  acetaminophen   Tablet .. 975 milliGRAM(s) Oral every 8 hours  cefTRIAXone   IVPB 1 Gram(s) IV Intermittent once  lactated ringers. 1000 milliLiter(s) (125 mL/Hr) IV Continuous <Continuous>  potassium chloride    Tablet ER 40 milliEquivalent(s) Oral every 4 hours    MEDICATIONS  (PRN):  ondansetron Injectable 4 milliGRAM(s) IV Push once PRN Nausea and/or Vomiting  oxyCODONE    IR 5 milliGRAM(s) Oral every 3 hours PRN Moderate Pain (4 - 6)  oxyCODONE    IR 2.5 milliGRAM(s) Oral every 3 hours PRN Mild Pain (1 - 3)

## 2018-09-25 NOTE — DISCHARGE NOTE ADULT - HOSPITAL COURSE
is an 88 yo F presenting to the ED after mechanical fall on 9/23/18 resulting in R elbow fx. PMH HTN, HLD, Depression, Dementia, Aortic Stenosis. She is s/p OR and elbow fixation, and she's mentating better today. We d/cd any doses of narcotics, we're encouraging use of tylenol and motrin. We've hydrated her and she can be d/cd today. She is mentating better today.     She can be d/cd to [facility] today.

## 2018-09-25 NOTE — DISCHARGE NOTE ADULT - CARE PLAN
Principal Discharge DX:	Open fracture of elbow, right, sequela Principal Discharge DX:	Open fracture of elbow, right, sequela  Goal:	Please take meds, seroquel at bedtime is for delirium and agitation  Assessment and plan of treatment:	Please take all meds and f/u with PMD  Secondary Diagnosis:	Depression, unspecified depression type

## 2018-09-25 NOTE — ED ADULT NURSE REASSESSMENT NOTE - NS ED NURSE REASSESS COMMENT FT1
Report given to receiving RN Christine in OR, pt belongings brought to security for lock up, hearing aids remain in place, awaiting transport to OR, pt aware of plan of care.

## 2018-09-25 NOTE — DISCHARGE NOTE ADULT - ADDITIONAL INSTRUCTIONS
ORTHOPEDIC FOLLOWUP RECOMMENDATIONS: The patient will be seen in the office between 1-2 weeks for splint removal and wound check. Sutures/Staples will be removed at that time. Patient may NOT shower until after re-evaluation in the office. The patient will continue with splint as applied in hospital and not remove until re-evaluation in the office. The patient will contact the office if the wound becomes red, has increasing pain, develops bleeding or discharge, an injury occurs, or has other concerns. The patient will continue LOVENOX for 4 weeks for DVTP. The patient will take OXYCODONE & TYLENOL for pain control and titrate according to prescription and patient needs. The patient is NON-weight bearing on the RIGHT UPPER extremity. The patient is recommended to elevated the affected extremity to reduce swelling. If the splint/cast  becomes too tight, the patient is to immediately elevate and contact the office to discuss further management or immediately proceed to the office if unable to make contact with the office.

## 2018-09-25 NOTE — DISCHARGE NOTE ADULT - MEDICATION SUMMARY - MEDICATIONS TO STOP TAKING
I will STOP taking the medications listed below when I get home from the hospital:    atenolol 25 mg oral tablet  -- 1 tab(s) by mouth once a day    hydroCHLOROthiazide 12.5 mg oral capsule  -- 1 cap(s) by mouth once a day    atenolol    hydroCHLOROthiazide

## 2018-09-25 NOTE — CONSULT NOTE ADULT - PROBLEM SELECTOR RECOMMENDATION 4
Hold Atenolol for now, check TFTs, Atropine by bedside with pacer pads near by,  Cardiology to follow in AM  case d/w Dr. Wagner

## 2018-09-25 NOTE — DISCHARGE NOTE ADULT - PLAN OF CARE
Please take meds, seroquel at bedtime is for delirium and agitation Please take all meds and f/u with PMD

## 2018-09-25 NOTE — CONSULT NOTE ADULT - PROBLEM SELECTOR RECOMMENDATION 2
Check ECHO in AM and based on results clearance will be provided  Monitor on Tele, trend CEx2, ECG in AM Check ECHO in AM and based on results clearance will be provided  Patient is an intermediate-high risk for planned OR procedure  Monitor on Tele, trend CEx2, ECG in AM

## 2018-09-25 NOTE — CONSULT NOTE ADULT - ASSESSMENT
Patient is an 86 yo F BIBA after she experienced a mechanical fall yesterday at home.  History obtained primarily from son who is by patient's bedside and medical records since patient is asleep.  Patient with mild cognitive decline, "received conscious sedation" during right elbow reduction in [facility].  Son states that patient was walking up the stairs in her home yesterday with a blanket in her hands.  Stepped on the blanket, tripped and fell down.  She landed on her hands.  The other son who she lives with, did not make muck of the fall, however, mom c/o right elbow pain so he took her to an Urgent Care center.  Found to have sustained right elbow fracture. Asked by Medicine for pre-op Cardiology clearance

## 2018-09-25 NOTE — CONSULT NOTE ADULT - PROBLEM SELECTOR RECOMMENDATION 9
Check Labs and ECG in AM, Planned OR intervention later today  Keep RUE elevated, pain management,   Ancef 2gm IV Q8 hours, Tylenol for fever

## 2018-09-26 LAB
ANION GAP SERPL CALC-SCNC: 14 MMOL/L — SIGNIFICANT CHANGE UP (ref 5–17)
BUN SERPL-MCNC: 18 MG/DL — SIGNIFICANT CHANGE UP (ref 8–20)
CALCIUM SERPL-MCNC: 9.3 MG/DL — SIGNIFICANT CHANGE UP (ref 8.6–10.2)
CHLORIDE SERPL-SCNC: 99 MMOL/L — SIGNIFICANT CHANGE UP (ref 98–107)
CO2 SERPL-SCNC: 24 MMOL/L — SIGNIFICANT CHANGE UP (ref 22–29)
CREAT SERPL-MCNC: 0.96 MG/DL — SIGNIFICANT CHANGE UP (ref 0.5–1.3)
CULTURE RESULTS: NO GROWTH — SIGNIFICANT CHANGE UP
GLUCOSE SERPL-MCNC: 101 MG/DL — SIGNIFICANT CHANGE UP (ref 70–115)
HCT VFR BLD CALC: 29.5 % — LOW (ref 37–47)
HGB BLD-MCNC: 10.1 G/DL — LOW (ref 12–16)
MAGNESIUM SERPL-MCNC: 1.2 MG/DL — LOW (ref 1.8–2.6)
MCHC RBC-ENTMCNC: 30.7 PG — SIGNIFICANT CHANGE UP (ref 27–31)
MCHC RBC-ENTMCNC: 34.2 G/DL — SIGNIFICANT CHANGE UP (ref 32–36)
MCV RBC AUTO: 89.7 FL — SIGNIFICANT CHANGE UP (ref 81–99)
PHOSPHATE SERPL-MCNC: 2.2 MG/DL — LOW (ref 2.4–4.7)
PLATELET # BLD AUTO: 227 K/UL — SIGNIFICANT CHANGE UP (ref 150–400)
POTASSIUM SERPL-MCNC: 3.2 MMOL/L — LOW (ref 3.5–5.3)
POTASSIUM SERPL-SCNC: 3.2 MMOL/L — LOW (ref 3.5–5.3)
RBC # BLD: 3.29 M/UL — LOW (ref 4.4–5.2)
RBC # FLD: 13.4 % — SIGNIFICANT CHANGE UP (ref 11–15.6)
SODIUM SERPL-SCNC: 137 MMOL/L — SIGNIFICANT CHANGE UP (ref 135–145)
SPECIMEN SOURCE: SIGNIFICANT CHANGE UP
TSH SERPL-MCNC: 3.11 UIU/ML — SIGNIFICANT CHANGE UP (ref 0.27–4.2)
WBC # BLD: 11.8 K/UL — HIGH (ref 4.8–10.8)
WBC # FLD AUTO: 11.8 K/UL — HIGH (ref 4.8–10.8)

## 2018-09-26 PROCEDURE — 99233 SBSQ HOSP IP/OBS HIGH 50: CPT

## 2018-09-26 RX ORDER — SODIUM CHLORIDE 9 MG/ML
1000 INJECTION, SOLUTION INTRAVENOUS
Qty: 0 | Refills: 0 | Status: DISCONTINUED | OUTPATIENT
Start: 2018-09-26 | End: 2018-09-27

## 2018-09-26 RX ORDER — MAGNESIUM SULFATE 500 MG/ML
2 VIAL (ML) INJECTION
Qty: 0 | Refills: 0 | Status: COMPLETED | OUTPATIENT
Start: 2018-09-26 | End: 2018-09-26

## 2018-09-26 RX ORDER — POTASSIUM CHLORIDE 20 MEQ
10 PACKET (EA) ORAL
Qty: 0 | Refills: 0 | Status: COMPLETED | OUTPATIENT
Start: 2018-09-26 | End: 2018-09-26

## 2018-09-26 RX ORDER — MAGNESIUM OXIDE 400 MG ORAL TABLET 241.3 MG
400 TABLET ORAL
Qty: 0 | Refills: 0 | Status: DISCONTINUED | OUTPATIENT
Start: 2018-09-26 | End: 2018-09-28

## 2018-09-26 RX ORDER — MAGNESIUM SULFATE 500 MG/ML
2 VIAL (ML) INJECTION ONCE
Qty: 0 | Refills: 0 | Status: COMPLETED | OUTPATIENT
Start: 2018-09-26 | End: 2018-09-26

## 2018-09-26 RX ORDER — METOPROLOL TARTRATE 50 MG
12.5 TABLET ORAL
Qty: 0 | Refills: 0 | Status: DISCONTINUED | OUTPATIENT
Start: 2018-09-26 | End: 2018-09-27

## 2018-09-26 RX ORDER — QUETIAPINE FUMARATE 200 MG/1
25 TABLET, FILM COATED ORAL AT BEDTIME
Qty: 0 | Refills: 0 | Status: DISCONTINUED | OUTPATIENT
Start: 2018-09-26 | End: 2018-09-28

## 2018-09-26 RX ORDER — POTASSIUM CHLORIDE 20 MEQ
40 PACKET (EA) ORAL EVERY 4 HOURS
Qty: 0 | Refills: 0 | Status: COMPLETED | OUTPATIENT
Start: 2018-09-26 | End: 2018-09-26

## 2018-09-26 RX ADMIN — Medication 12.5 MILLIGRAM(S): at 17:59

## 2018-09-26 RX ADMIN — Medication 100 MILLIEQUIVALENT(S): at 12:17

## 2018-09-26 RX ADMIN — QUETIAPINE FUMARATE 25 MILLIGRAM(S): 200 TABLET, FILM COATED ORAL at 22:12

## 2018-09-26 RX ADMIN — ENOXAPARIN SODIUM 40 MILLIGRAM(S): 100 INJECTION SUBCUTANEOUS at 22:12

## 2018-09-26 RX ADMIN — Medication 100 MILLIEQUIVALENT(S): at 14:52

## 2018-09-26 RX ADMIN — Medication 100 MILLIGRAM(S): at 14:55

## 2018-09-26 RX ADMIN — Medication 50 GRAM(S): at 14:52

## 2018-09-26 RX ADMIN — Medication 100 MILLIGRAM(S): at 01:19

## 2018-09-26 RX ADMIN — Medication 975 MILLIGRAM(S): at 22:12

## 2018-09-26 RX ADMIN — Medication 975 MILLIGRAM(S): at 14:53

## 2018-09-26 RX ADMIN — Medication 50 GRAM(S): at 17:59

## 2018-09-26 RX ADMIN — Medication 100 MILLIGRAM(S): at 12:16

## 2018-09-26 RX ADMIN — MAGNESIUM OXIDE 400 MG ORAL TABLET 400 MILLIGRAM(S): 241.3 TABLET ORAL at 17:59

## 2018-09-26 RX ADMIN — Medication 100 MILLIGRAM(S): at 22:12

## 2018-09-26 RX ADMIN — Medication 975 MILLIGRAM(S): at 15:30

## 2018-09-26 RX ADMIN — Medication 100 MILLIEQUIVALENT(S): at 18:00

## 2018-09-26 RX ADMIN — Medication 1 TABLET(S): at 14:54

## 2018-09-26 RX ADMIN — Medication 975 MILLIGRAM(S): at 22:50

## 2018-09-26 RX ADMIN — Medication 85 MILLIMOLE(S): at 17:58

## 2018-09-26 RX ADMIN — SODIUM CHLORIDE 75 MILLILITER(S): 9 INJECTION, SOLUTION INTRAVENOUS at 19:09

## 2018-09-26 RX ADMIN — Medication 50 GRAM(S): at 10:31

## 2018-09-26 NOTE — PHYSICAL THERAPY INITIAL EVALUATION ADULT - ADDITIONAL COMMENTS
Pt lives in a house with her son. Son works during the day and pt is alone. She has a flight of stairs to bedroom with a HR. Pt is unable to provide detail to her living arrangements therefore, history was obtained from daughter-in-law at bedside who states she doesn't think pt has been bathing too well and that she has been having difficulty feeding herself. She owns a cane and a RW but doesn't use them.

## 2018-09-26 NOTE — PHYSICAL THERAPY INITIAL EVALUATION ADULT - PLANNED THERAPY INTERVENTIONS, PT EVAL
gait training/postural re-education/transfer training/strengthening/balance training/bed mobility training

## 2018-09-26 NOTE — PHYSICAL THERAPY INITIAL EVALUATION ADULT - ACTIVE RANGE OF MOTION EXAMINATION, REHAB EVAL
Left UE Active ROM was WFL (within functional limits)/bilateral  lower extremity Active ROM was WFL (within functional limits)

## 2018-09-26 NOTE — OCCUPATIONAL THERAPY INITIAL EVALUATION ADULT - PLANNED THERAPY INTERVENTIONS, OT EVAL
balance training/motor coordination training/ROM/neuromuscular re-education/ADL retraining/bed mobility training/fine motor coordination training/transfer training

## 2018-09-26 NOTE — PROGRESS NOTE ADULT - SUBJECTIVE AND OBJECTIVE BOX
is an 88 yo F presenting to the ED after mechanical fall on 9/23/18 resulting in R elbow fx. PMH HTN, HLD, Depression, Dementia, Aortic Stenosis.   She is s/p OR and elbow fixation, but she's still drowsy and appears confused. There are no signs of UTI or infection.     Summary:   REVIEW OF SYSTEMS    General:	unobtainable, drowsy    Skin/Breast:  	  Ophthalmologic:  	  ENMT:	    Respiratory and Thorax:  	  Cardiovascular:	    Gastrointestinal:	    Genitourinary:	    Musculoskeletal:	    Neurological:	    Psychiatric:	    Hematology/Lymphatics:	    Endocrine:	    Allergic/Immunologic:	    Vital Signs Last 24 Hrs  T(C): 37.8 (26 Sep 2018 09:49), Max: 37.8 (26 Sep 2018 09:49)  T(F): 100 (26 Sep 2018 09:49), Max: 100 (26 Sep 2018 09:49)  HR: 61 (26 Sep 2018 09:49) (48 - 63)  BP: 165/69 (26 Sep 2018 09:49) (98/60 - 165/69)  BP(mean): --  RR: 16 (26 Sep 2018 09:49) (15 - 17)  SpO2: 96% (26 Sep 2018 09:49) (92% - 96%)NERAL: frail elderly female, , responds to verbal stimuli, arousable  HEENT: head NC/AT; EOM intact, PERRLA, conjunctiva & sclera clear; moist mucous membranes  NECK: supple, no JVD  RESPIRATORY: CTA B/L, no wheezing, rales, rhonchi or rubs  CARDIOVASCULAR: S1&S2, RRR, +4/6 JAVI RUSB w/ radiation to carotids  ABDOMEN: soft, non-tender, non-distended, + Bowel sounds x4 quadrants, no guarding, rebound or rigidity  MUSCULOSKELETAL:  no clubbing, cyanosis or edema of LE. RUE in splint s/p bandages  LYMPH: no cervical lymphadenopathy  VASCULAR: Radial pulses 2+ bilaterally, no varicose veins   SKIN: warm and dry, color normal  NEUROLOGIC: alert, responsive to verbal and tactile stimuli, MAEx4, CN 2-12 intact, 5/5 LLE/RLE, LUE muslce strength, normal finger  b/l UE  Psych: Normal mood and affect, normal behavior                                 10.1   11.8  )-----------( 227      ( 26 Sep 2018 07:01 )             29.5     09-26    137  |  99  |  18.0  ----------------------------<  101  3.2<L>   |  24.0  |  0.96    Ca    9.3      26 Sep 2018 07:01  Phos  2.2     09-26  Mg     1.2     09-26    TPro  8.0  /  Alb  4.4  /  TBili  1.4  /  DBili  x   /  AST  23  /  ALT  12  /  AlkPhos  107  09-24    LIVER FUNCTIONS - ( 24 Sep 2018 22:10 )  Alb: 4.4 g/dL / Pro: 8.0 g/dL / ALK PHOS: 107 U/L / ALT: 12 U/L / AST: 23 U/L / GGT: x           PT/INR - ( 24 Sep 2018 22:10 )   PT: 12.6 sec;   INR: 1.14 ratio      PTT - ( 24 Sep 2018 22:10 )  PTT:26.4 sec    Radiology:     MEDICATIONS  (STANDING):  acetaminophen   Tablet .. 975 milliGRAM(s) Oral every 8 hours  ceFAZolin   IVPB 2000 milliGRAM(s) IV Intermittent every 8 hours  docusate sodium 100 milliGRAM(s) Oral three times a day  enoxaparin Injectable 40 milliGRAM(s) SubCutaneous daily  ferrous    sulfate 325 milliGRAM(s) Oral three times a day with meals  lactated ringers. 1000 milliLiter(s) (125 mL/Hr) IV Continuous <Continuous>  magnesium oxide 400 milliGRAM(s) Oral two times a day with meals  magnesium sulfate  IVPB 2 Gram(s) IV Intermittent every 2 hours  multivitamin 1 Tablet(s) Oral daily  potassium chloride    Tablet ER 40 milliEquivalent(s) Oral every 4 hours  potassium chloride  10 mEq/100 mL IVPB 10 milliEquivalent(s) IV Intermittent every 1 hour  sodium chloride 0.9%. 1000 milliLiter(s) (75 mL/Hr) IV Continuous <Continuous>  sodium phosphate IVPB 30 milliMole(s) IV Intermittent once    MEDICATIONS  (PRN):  ondansetron Injectable 4 milliGRAM(s) IV Push every 6 hours PRN Nausea and/or Vomiting  oxyCODONE    IR 5 milliGRAM(s) Oral every 3 hours PRN Moderate Pain (4 - 6)  oxyCODONE    IR 2.5 milliGRAM(s) Oral every 3 hours PRN Mild Pain (1 - 3)

## 2018-09-26 NOTE — PROGRESS NOTE ADULT - SUBJECTIVE AND OBJECTIVE BOX
Pt Name: JO ROBERTS    MRN: [id number]      Patient is an 88 yo F being followed for open R olecranon fracture. Patient is POD #1 s/p R elbow incision and drainage and open reduction internal fixation of R olecranon. No acute events reported overnight. Patient was seen and evaluated at bedside this AM. Examination limited secondary to patient's dementia. Patient's pain is well controlled at this time. No orthopaedic complaints expressed at this time. Patient denies fever, chills, abdominal pain, calf pain, numbness, or paralysis.     PAST MEDICAL & SURGICAL HISTORY:  Aortic valve stenosis, etiology of cardiac valve disease unspecified  Depression, unspecified depression type  Essential hypertension  HLD (hyperlipidemia)  Hypertension  H/O hysterectomy for benign disease  No significant past surgical history      Allergies: No Known Allergies      Medications: acetaminophen   Tablet .. 975 milliGRAM(s) Oral every 8 hours  ceFAZolin   IVPB 2000 milliGRAM(s) IV Intermittent every 8 hours  enoxaparin Injectable 40 milliGRAM(s) SubCutaneous daily  lactated ringers. 1000 milliLiter(s) IV Continuous <Continuous>  oxyCODONE    IR 5 milliGRAM(s) Oral every 3 hours PRN  oxyCODONE    IR 2.5 milliGRAM(s) Oral every 3 hours PRN  potassium chloride   Powder 40 milliEquivalent(s) Oral every 4 hours                          10.1   11.8  )-----------( 227      ( 26 Sep 2018 07:01 )             29.5         137  |  99  |  18.0  ----------------------------<  101  3.2<L>   |  24.0  |  0.96    Ca    9.3      26 Sep 2018 07:01  Phos  2.2       Mg     1.2         TPro  8.0  /  Alb  4.4  /  TBili  1.4  /  DBili  x   /  AST  23  /  ALT  12  /  AlkPhos  107        PHYSICAL EXAM:    Vital Signs Last 24 Hrs  T(C): 37.1 (26 Sep 2018 06:05), Max: 37.1 (26 Sep 2018 06:05)  T(F): 98.7 (26 Sep 2018 06:05), Max: 98.7 (26 Sep 2018 06:05)  HR: 63 (26 Sep 2018 07:26) (48 - 63)  BP: 158/59 (26 Sep 2018 06:05) (96/42 - 158/59)  BP(mean): --  RR: 17 (26 Sep 2018 06:05) (12 - 18)  SpO2: 92% (26 Sep 2018 07:26) (92% - 100%)  Daily     Daily Weight in k.2 (26 Sep 2018 06:05)    Appearance: Alert and oriented X2, NAD.    Skin: no rash on visible skin. Skin is clean, dry and intact. No bleeding. No abrasions. No ulcerations.    Musculoskeletal:         Right Upper Extremity: Skin warm and intact where exposed. Posterior splint clean, dry, and intact. No drainage noted. RIOS dressing in place and functioning. ROM not assessed secondary to pain. No wrist drop. SILT distally. Finger flexors/extensors intact. BCR.        Left Lower Extremity: calf supple and nontender.        Right Lower Extremity: calf supple and nontender.       A/P: Pt is a 88 yo Female  POD #1 following R elbow washout and olecranon open reduction internal fixation.     PLAN:   -Pain control.  -Bedside PT.  -DVT PPX as per primary team.  -Incentive marifer.  -Ancef x 24 hrs post-op.  -NWB RUE in posterior slab splint.  -SCDs.  -Diet, GI ppx, bowel regimen.  -Elevate affected extremity to prevent swelling.

## 2018-09-26 NOTE — OCCUPATIONAL THERAPY INITIAL EVALUATION ADULT - ADDITIONAL COMMENTS
Pt is a poor historian  Pt's daughter in law reports that pt was starting to have difficulty bathing and was not very thorough with hygiene and did not receive much assist from her son  Pt owns a rollator

## 2018-09-26 NOTE — PATIENT PROFILE ADULT. - NS PRO ABUSE SCREEN AFRAID ANYONE YN
Pt given discharge instructions by Felicita KENDRICK she verbalizes an understanding, pt stable at time of discharge ambulates to rhoda
no

## 2018-09-27 DIAGNOSIS — G30.1 ALZHEIMER'S DISEASE WITH LATE ONSET: ICD-10-CM

## 2018-09-27 LAB
ANION GAP SERPL CALC-SCNC: 12 MMOL/L — SIGNIFICANT CHANGE UP (ref 5–17)
BASOPHILS # BLD AUTO: 0 K/UL — SIGNIFICANT CHANGE UP (ref 0–0.2)
BASOPHILS NFR BLD AUTO: 0.3 % — SIGNIFICANT CHANGE UP (ref 0–2)
BUN SERPL-MCNC: 15 MG/DL — SIGNIFICANT CHANGE UP (ref 8–20)
CALCIUM SERPL-MCNC: 8.6 MG/DL — SIGNIFICANT CHANGE UP (ref 8.6–10.2)
CHLORIDE SERPL-SCNC: 102 MMOL/L — SIGNIFICANT CHANGE UP (ref 98–107)
CO2 SERPL-SCNC: 26 MMOL/L — SIGNIFICANT CHANGE UP (ref 22–29)
CREAT SERPL-MCNC: 0.75 MG/DL — SIGNIFICANT CHANGE UP (ref 0.5–1.3)
EOSINOPHIL # BLD AUTO: 0.3 K/UL — SIGNIFICANT CHANGE UP (ref 0–0.5)
EOSINOPHIL NFR BLD AUTO: 3.8 % — SIGNIFICANT CHANGE UP (ref 0–6)
GLUCOSE SERPL-MCNC: 111 MG/DL — SIGNIFICANT CHANGE UP (ref 70–115)
HCT VFR BLD CALC: 28.1 % — LOW (ref 37–47)
HGB BLD-MCNC: 9.1 G/DL — LOW (ref 12–16)
LYMPHOCYTES # BLD AUTO: 2.1 K/UL — SIGNIFICANT CHANGE UP (ref 1–4.8)
LYMPHOCYTES # BLD AUTO: 29.2 % — SIGNIFICANT CHANGE UP (ref 20–55)
MAGNESIUM SERPL-MCNC: 2.1 MG/DL — SIGNIFICANT CHANGE UP (ref 1.6–2.6)
MCHC RBC-ENTMCNC: 29.4 PG — SIGNIFICANT CHANGE UP (ref 27–31)
MCHC RBC-ENTMCNC: 32.4 G/DL — SIGNIFICANT CHANGE UP (ref 32–36)
MCV RBC AUTO: 90.6 FL — SIGNIFICANT CHANGE UP (ref 81–99)
MONOCYTES # BLD AUTO: 0.9 K/UL — HIGH (ref 0–0.8)
MONOCYTES NFR BLD AUTO: 11.9 % — HIGH (ref 3–10)
NEUTROPHILS # BLD AUTO: 4 K/UL — SIGNIFICANT CHANGE UP (ref 1.8–8)
NEUTROPHILS NFR BLD AUTO: 54.5 % — SIGNIFICANT CHANGE UP (ref 37–73)
PHOSPHATE SERPL-MCNC: 3.9 MG/DL — SIGNIFICANT CHANGE UP (ref 2.4–4.7)
PLATELET # BLD AUTO: 203 K/UL — SIGNIFICANT CHANGE UP (ref 150–400)
POTASSIUM SERPL-MCNC: 3.2 MMOL/L — LOW (ref 3.5–5.3)
POTASSIUM SERPL-SCNC: 3.2 MMOL/L — LOW (ref 3.5–5.3)
RBC # BLD: 3.1 M/UL — LOW (ref 4.4–5.2)
RBC # FLD: 13.8 % — SIGNIFICANT CHANGE UP (ref 11–15.6)
SODIUM SERPL-SCNC: 140 MMOL/L — SIGNIFICANT CHANGE UP (ref 135–145)
WBC # BLD: 7.3 K/UL — SIGNIFICANT CHANGE UP (ref 4.8–10.8)
WBC # FLD AUTO: 7.3 K/UL — SIGNIFICANT CHANGE UP (ref 4.8–10.8)

## 2018-09-27 PROCEDURE — 99232 SBSQ HOSP IP/OBS MODERATE 35: CPT

## 2018-09-27 RX ORDER — METOPROLOL TARTRATE 50 MG
25 TABLET ORAL
Qty: 0 | Refills: 0 | Status: DISCONTINUED | OUTPATIENT
Start: 2018-09-27 | End: 2018-09-28

## 2018-09-27 RX ORDER — POTASSIUM CHLORIDE 20 MEQ
10 PACKET (EA) ORAL ONCE
Qty: 0 | Refills: 0 | Status: COMPLETED | OUTPATIENT
Start: 2018-09-27 | End: 2018-09-27

## 2018-09-27 RX ORDER — POTASSIUM CHLORIDE 20 MEQ
40 PACKET (EA) ORAL EVERY 4 HOURS
Qty: 0 | Refills: 0 | Status: COMPLETED | OUTPATIENT
Start: 2018-09-27 | End: 2018-09-27

## 2018-09-27 RX ORDER — SODIUM CHLORIDE 9 MG/ML
1000 INJECTION, SOLUTION INTRAVENOUS
Qty: 0 | Refills: 0 | Status: DISCONTINUED | OUTPATIENT
Start: 2018-09-27 | End: 2018-09-28

## 2018-09-27 RX ORDER — MAGNESIUM SULFATE 500 MG/ML
1 VIAL (ML) INJECTION ONCE
Qty: 0 | Refills: 0 | Status: COMPLETED | OUTPATIENT
Start: 2018-09-27 | End: 2018-09-27

## 2018-09-27 RX ADMIN — Medication 25 MILLIGRAM(S): at 18:06

## 2018-09-27 RX ADMIN — Medication 100 MILLIEQUIVALENT(S): at 21:12

## 2018-09-27 RX ADMIN — Medication 325 MILLIGRAM(S): at 13:28

## 2018-09-27 RX ADMIN — Medication 975 MILLIGRAM(S): at 06:59

## 2018-09-27 RX ADMIN — Medication 12.5 MILLIGRAM(S): at 06:21

## 2018-09-27 RX ADMIN — QUETIAPINE FUMARATE 25 MILLIGRAM(S): 200 TABLET, FILM COATED ORAL at 21:12

## 2018-09-27 RX ADMIN — SODIUM CHLORIDE 75 MILLILITER(S): 9 INJECTION, SOLUTION INTRAVENOUS at 13:28

## 2018-09-27 RX ADMIN — Medication 975 MILLIGRAM(S): at 06:20

## 2018-09-27 RX ADMIN — Medication 100 MILLIGRAM(S): at 08:59

## 2018-09-27 RX ADMIN — Medication 100 GRAM(S): at 20:23

## 2018-09-27 RX ADMIN — Medication 40 MILLIEQUIVALENT(S): at 13:28

## 2018-09-27 RX ADMIN — Medication 100 MILLIGRAM(S): at 13:28

## 2018-09-27 RX ADMIN — MAGNESIUM OXIDE 400 MG ORAL TABLET 400 MILLIGRAM(S): 241.3 TABLET ORAL at 09:00

## 2018-09-27 RX ADMIN — Medication 1 TABLET(S): at 09:05

## 2018-09-27 RX ADMIN — Medication 25 MILLIGRAM(S): at 13:28

## 2018-09-27 RX ADMIN — ENOXAPARIN SODIUM 40 MILLIGRAM(S): 100 INJECTION SUBCUTANEOUS at 21:12

## 2018-09-27 RX ADMIN — Medication 325 MILLIGRAM(S): at 09:00

## 2018-09-27 RX ADMIN — Medication 100 MILLIGRAM(S): at 06:21

## 2018-09-27 NOTE — PROGRESS NOTE ADULT - SUBJECTIVE AND OBJECTIVE BOX
is an 88 yo F presenting to the ED after mechanical fall on 9/23/18 resulting in R elbow fx. PMH HTN, HLD, Depression, Dementia, Aortic Stenosis.   She is s/p OR and elbow fixation, and she's mentating better today. We d/cd any doses of narcotics, we're encouraging use of tylenol and motrin. We've hydrated her and are continuing maintenance for another 18 hours. I spoke with her son Aman who agreed to the use of seroquel at bedtime last night. She's undergoing a PT consult and can potentially be d/cd tomm. She is mentating better today.     Summary:   REVIEW OF SYSTEMS    General:	improving, mentating today, answers simple questions    Skin/Breast:  	  Ophthalmologic:  	  ENMT:	    Respiratory and Thorax:  	  Cardiovascular:	    Gastrointestinal:	    Genitourinary:	    Musculoskeletal:	    Neurological:	    Psychiatric:	    Hematology/Lymphatics:	    Endocrine:	    Allergic/Immunologic:	  Vital Signs Last 24 Hrs  T(C): 36.8 (27 Sep 2018 06:17), Max: 37.1 (26 Sep 2018 20:06)  T(F): 98.2 (27 Sep 2018 06:17), Max: 98.7 (26 Sep 2018 20:06)  HR: 54 (27 Sep 2018 06:17) (54 - 66)  BP: 140/58 (27 Sep 2018 06:17) (92/53 - 142/68)  BP(mean): --  RR: 16 (27 Sep 2018 06:17) (16 - 17)  SpO2: 96% (27 Sep 2018 06:17) (96% - 97%)  GENERAL: frail elderly female, , responds to verbal stimuli, answering simple questions  HEENT: head NC/AT; EOM intact, PERRLA, conjunctiva & sclera clear; moist mucous membranes  NECK: supple, no JVD  RESPIRATORY: CTA B/L, no wheezing, rales, rhonchi or rubs  CARDIOVASCULAR: S1&S2, RRR, +4/6 JAVI RUSB w/ radiation to carotids  ABDOMEN: soft, non-tender, non-distended, + Bowel sounds x4 quadrants, no guarding, rebound or rigidity  MUSCULOSKELETAL:  no clubbing, cyanosis or edema of LE. RUE in splint s/p bandages  LYMPH: no cervical lymphadenopathy  VASCULAR: Radial pulses 2+ bilaterally, no varicose veins   SKIN: warm and dry, color normal  NEUROLOGIC: alert, responsive to verbal and tactile stimuli, MAEx4, CN 2-12 intact, 5/5 LLE/RLE, LUE muslce strength, normal finger  b/l UE  Psych: Normal mood and affect, normal behavior                                    9.1    7.3   )-----------( 203      ( 27 Sep 2018 06:16 )             28.1     09-27    140  |  102  |  15.0  ----------------------------<  111  3.2<L>   |  26.0  |  0.75    Ca    8.6      27 Sep 2018 06:16  Phos  3.9     09-27  Mg     2.1     09-27    Radiology:     MEDICATIONS  (STANDING):  acetaminophen   Tablet .. 975 milliGRAM(s) Oral every 8 hours  docusate sodium 100 milliGRAM(s) Oral three times a day  enoxaparin Injectable 40 milliGRAM(s) SubCutaneous daily  ferrous    sulfate 325 milliGRAM(s) Oral three times a day with meals  lactated ringers. 1000 milliLiter(s) (75 mL/Hr) IV Continuous <Continuous>  magnesium oxide 400 milliGRAM(s) Oral two times a day with meals  metoprolol tartrate 25 milliGRAM(s) Oral two times a day  multivitamin 1 Tablet(s) Oral daily  potassium chloride   Powder 40 milliEquivalent(s) Oral every 4 hours  QUEtiapine 25 milliGRAM(s) Oral at bedtime    MEDICATIONS  (PRN):  ondansetron Injectable 4 milliGRAM(s) IV Push every 6 hours PRN Nausea and/or Vomiting  oxyCODONE    IR 2.5 milliGRAM(s) Oral every 3 hours PRN Mild Pain (1 - 3)

## 2018-09-27 NOTE — PROGRESS NOTE ADULT - SUBJECTIVE AND OBJECTIVE BOX
Pt Name: JO ROBERTS    MRN: [id number]      Patient is an 86 yo female being followed for open R olecranon fracture, POD #2, for right elbow incision and drainage and open reduction internal fixation of R olecranon. Patient was seen and examined this morning. Examination limited secondary to patient's dementia. No significant events reported overnight from staff or patient. Patient was comfortable in bed at this time, pain well controlled, no orthopedic complaints. Patient's pain is well controlled at this time. Patient denies fever, chills, abdominal pain, calf pain, numbness, or tingling.      PAST MEDICAL & SURGICAL HISTORY:  PAST MEDICAL & SURGICAL HISTORY:  Aortic valve stenosis, etiology of cardiac valve disease unspecified  Depression, unspecified depression type  Essential hypertension  HLD (hyperlipidemia)  Hypertension  H/O hysterectomy for benign disease  No significant past surgical history      Allergies: No Known Allergies      Medications: acetaminophen   Tablet .. 975 milliGRAM(s) Oral every 8 hours  docusate sodium 100 milliGRAM(s) Oral three times a day  enoxaparin Injectable 40 milliGRAM(s) SubCutaneous daily  ferrous    sulfate 325 milliGRAM(s) Oral three times a day with meals  lactated ringers. 1000 milliLiter(s) IV Continuous <Continuous>  magnesium oxide 400 milliGRAM(s) Oral two times a day with meals  metoprolol tartrate 12.5 milliGRAM(s) Oral two times a day  multivitamin 1 Tablet(s) Oral daily  ondansetron Injectable 4 milliGRAM(s) IV Push every 6 hours PRN  oxyCODONE    IR 2.5 milliGRAM(s) Oral every 3 hours PRN  QUEtiapine 25 milliGRAM(s) Oral at bedtime                            9.1    7.3   )-----------( 203      ( 27 Sep 2018 06:16 )             28.1     09-    140  |  102  |  15.0  ----------------------------<  111  3.2<L>   |  26.0  |  0.75    Ca    8.6      27 Sep 2018 06:16  Phos  3.9     -  Mg     2.1     -        PHYSICAL EXAM:    Vital Signs Last 24 Hrs  T(C): 36.8 (27 Sep 2018 06:17), Max: 37.1 (26 Sep 2018 20:06)  T(F): 98.2 (27 Sep 2018 06:17), Max: 98.7 (26 Sep 2018 20:06)  HR: 54 (27 Sep 2018 06:17) (54 - 66)  BP: 140/58 (27 Sep 2018 06:17) (92/53 - 142/68)  BP(mean): --  RR: 16 (27 Sep 2018 06:17) (16 - 17)  SpO2: 96% (27 Sep 2018 06:17) (96% - 97%)  Daily     Daily Weight in k.5 (27 Sep 2018 00:00)      Appearance: Alert and oriented to person and place, NAD.    Skin: no rash on visible skin. Skin is clean, dry and intact. No bleeding. No abrasions. No ulcerations.    Musculoskeletal:         Right Upper Extremity: Skin warm and intact. Posterior splint in place- clean, dry, and intact. No drainage noted. ROM not assessed secondary to pain. No wrist drop. SILT distally. Finger flexors/extensors intact.        Left Lower Extremity: calf supple and nontender.        Right Lower Extremity: calf supple and nontender.       A/P: Pt is a 86 yo Female  POD #1 following R elbow washout and olecranon open reduction internal fixation.     PLAN:   -Pain control as clinically indicated  -WBAT lower extremities  -DVT PPX as per primary team, SCDs  -Encourage Incentive spirometry  -NWB RUE in posterior slab splint  -Continue to advance diet  -Elevate affected extremity to prevent swelling Pt Name: JO ROBERTS    MRN: [id number]      Patient is an 86 yo female being followed for open R olecranon fracture, POD #2, for right elbow incision and drainage and open reduction internal fixation of R olecranon. Patient was seen and examined this morning. Examination limited secondary to patient's dementia. No significant events reported overnight from staff or patient. Patient was comfortable in bed at this time, pain well controlled, no orthopedic complaints. Patient's pain is well controlled at this time. Patient denies fever, chills, abdominal pain, calf pain, numbness, or tingling.      PAST MEDICAL & SURGICAL HISTORY:  PAST MEDICAL & SURGICAL HISTORY:  Aortic valve stenosis, etiology of cardiac valve disease unspecified  Depression, unspecified depression type  Essential hypertension  HLD (hyperlipidemia)  Hypertension  H/O hysterectomy for benign disease  No significant past surgical history      Allergies: No Known Allergies      Medications: acetaminophen   Tablet .. 975 milliGRAM(s) Oral every 8 hours  docusate sodium 100 milliGRAM(s) Oral three times a day  enoxaparin Injectable 40 milliGRAM(s) SubCutaneous daily  ferrous    sulfate 325 milliGRAM(s) Oral three times a day with meals  lactated ringers. 1000 milliLiter(s) IV Continuous <Continuous>  magnesium oxide 400 milliGRAM(s) Oral two times a day with meals  metoprolol tartrate 12.5 milliGRAM(s) Oral two times a day  multivitamin 1 Tablet(s) Oral daily  ondansetron Injectable 4 milliGRAM(s) IV Push every 6 hours PRN  oxyCODONE    IR 2.5 milliGRAM(s) Oral every 3 hours PRN  QUEtiapine 25 milliGRAM(s) Oral at bedtime                            9.1    7.3   )-----------( 203      ( 27 Sep 2018 06:16 )             28.1     09-    140  |  102  |  15.0  ----------------------------<  111  3.2<L>   |  26.0  |  0.75    Ca    8.6      27 Sep 2018 06:16  Phos  3.9     -  Mg     2.1     -        PHYSICAL EXAM:    Vital Signs Last 24 Hrs  T(C): 36.8 (27 Sep 2018 06:17), Max: 37.1 (26 Sep 2018 20:06)  T(F): 98.2 (27 Sep 2018 06:17), Max: 98.7 (26 Sep 2018 20:06)  HR: 54 (27 Sep 2018 06:17) (54 - 66)  BP: 140/58 (27 Sep 2018 06:17) (92/53 - 142/68)  BP(mean): --  RR: 16 (27 Sep 2018 06:17) (16 - 17)  SpO2: 96% (27 Sep 2018 06:17) (96% - 97%)  Daily     Daily Weight in k.5 (27 Sep 2018 00:00)      Appearance: Alert and oriented to person and place, NAD.    Skin: no rash on visible skin. Skin is clean, dry and intact. No bleeding. No abrasions. No ulcerations.    Musculoskeletal:         Right Upper Extremity: Skin warm and intact. Posterior splint in place- clean, dry, and intact. No drainage noted. +wrist flexion/ +wrist extension. SILT distally. Finger flexors/extensors intact.        Left Lower Extremity: calf supple and nontender.        Right Lower Extremity: calf supple and nontender.       A/P: Pt is a 86 yo Female  POD #1 following R elbow washout and olecranon open reduction internal fixation.     PLAN:   -Pain control as clinically indicated  -WBAT lower extremities  -DVT PPX as per primary team, SCDs  -Encourage Incentive spirometry  -NWB RUE in posterior slab splint  -Continue to advance diet

## 2018-09-27 NOTE — PROGRESS NOTE ADULT - SUBJECTIVE AND OBJECTIVE BOX
Patient seen and examined post-anesthesia.   NAD.  Denies anesthesia complaints.  Instructed to have nurse contact anesthesia dept if any issues arise.

## 2018-09-28 VITALS
TEMPERATURE: 98 F | SYSTOLIC BLOOD PRESSURE: 140 MMHG | DIASTOLIC BLOOD PRESSURE: 66 MMHG | OXYGEN SATURATION: 99 % | RESPIRATION RATE: 17 BRPM | HEART RATE: 62 BPM

## 2018-09-28 LAB
ANION GAP SERPL CALC-SCNC: 11 MMOL/L — SIGNIFICANT CHANGE UP (ref 5–17)
BUN SERPL-MCNC: 16 MG/DL — SIGNIFICANT CHANGE UP (ref 8–20)
CALCIUM SERPL-MCNC: 9 MG/DL — SIGNIFICANT CHANGE UP (ref 8.6–10.2)
CHLORIDE SERPL-SCNC: 102 MMOL/L — SIGNIFICANT CHANGE UP (ref 98–107)
CO2 SERPL-SCNC: 26 MMOL/L — SIGNIFICANT CHANGE UP (ref 22–29)
CREAT SERPL-MCNC: 0.69 MG/DL — SIGNIFICANT CHANGE UP (ref 0.5–1.3)
GLUCOSE SERPL-MCNC: 88 MG/DL — SIGNIFICANT CHANGE UP (ref 70–115)
MAGNESIUM SERPL-MCNC: 1.9 MG/DL — SIGNIFICANT CHANGE UP (ref 1.6–2.6)
PHOSPHATE SERPL-MCNC: 2.4 MG/DL — SIGNIFICANT CHANGE UP (ref 2.4–4.7)
POTASSIUM SERPL-MCNC: 3.8 MMOL/L — SIGNIFICANT CHANGE UP (ref 3.5–5.3)
POTASSIUM SERPL-SCNC: 3.8 MMOL/L — SIGNIFICANT CHANGE UP (ref 3.5–5.3)
SODIUM SERPL-SCNC: 139 MMOL/L — SIGNIFICANT CHANGE UP (ref 135–145)

## 2018-09-28 PROCEDURE — 99239 HOSP IP/OBS DSCHRG MGMT >30: CPT

## 2018-09-28 RX ORDER — LISINOPRIL 2.5 MG/1
1 TABLET ORAL
Qty: 0 | Refills: 0 | COMMUNITY
Start: 2018-09-28

## 2018-09-28 RX ORDER — BENZOCAINE AND MENTHOL 5; 1 G/100ML; G/100ML
1 LIQUID ORAL EVERY 4 HOURS
Qty: 0 | Refills: 0 | Status: DISCONTINUED | OUTPATIENT
Start: 2018-09-28 | End: 2018-09-28

## 2018-09-28 RX ORDER — OXYCODONE HYDROCHLORIDE 5 MG/1
2.5 TABLET ORAL
Qty: 0 | Refills: 0 | COMMUNITY
Start: 2018-09-28

## 2018-09-28 RX ORDER — METOPROLOL TARTRATE 50 MG
1 TABLET ORAL
Qty: 60 | Refills: 0 | OUTPATIENT
Start: 2018-09-28 | End: 2018-10-27

## 2018-09-28 RX ORDER — ATORVASTATIN CALCIUM 80 MG/1
20 TABLET, FILM COATED ORAL AT BEDTIME
Qty: 0 | Refills: 0 | Status: DISCONTINUED | OUTPATIENT
Start: 2018-09-28 | End: 2018-09-28

## 2018-09-28 RX ORDER — SERTRALINE 25 MG/1
75 TABLET, FILM COATED ORAL DAILY
Qty: 0 | Refills: 0 | Status: DISCONTINUED | OUTPATIENT
Start: 2018-09-28 | End: 2018-09-28

## 2018-09-28 RX ORDER — ASPIRIN/CALCIUM CARB/MAGNESIUM 324 MG
81 TABLET ORAL DAILY
Qty: 0 | Refills: 0 | Status: DISCONTINUED | OUTPATIENT
Start: 2018-09-28 | End: 2018-09-28

## 2018-09-28 RX ORDER — QUETIAPINE FUMARATE 200 MG/1
1 TABLET, FILM COATED ORAL
Qty: 0 | Refills: 0 | COMMUNITY
Start: 2018-09-28

## 2018-09-28 RX ORDER — ACETAMINOPHEN 500 MG
3 TABLET ORAL
Qty: 0 | Refills: 0 | COMMUNITY
Start: 2018-09-28

## 2018-09-28 RX ORDER — MECLIZINE HCL 12.5 MG
12.5 TABLET ORAL
Qty: 0 | Refills: 0 | Status: DISCONTINUED | OUTPATIENT
Start: 2018-09-28 | End: 2018-09-28

## 2018-09-28 RX ORDER — DOCUSATE SODIUM 100 MG
1 CAPSULE ORAL
Qty: 0 | Refills: 0 | COMMUNITY
Start: 2018-09-28

## 2018-09-28 RX ORDER — MAGNESIUM OXIDE 400 MG ORAL TABLET 241.3 MG
1 TABLET ORAL
Qty: 0 | Refills: 0 | COMMUNITY
Start: 2018-09-28

## 2018-09-28 RX ORDER — LISINOPRIL 2.5 MG/1
5 TABLET ORAL DAILY
Qty: 0 | Refills: 0 | Status: DISCONTINUED | OUTPATIENT
Start: 2018-09-28 | End: 2018-09-28

## 2018-09-28 RX ADMIN — Medication 100 MILLIGRAM(S): at 06:11

## 2018-09-28 RX ADMIN — Medication 25 MILLIGRAM(S): at 06:11

## 2018-09-28 NOTE — PROGRESS NOTE ADULT - PROBLEM SELECTOR PROBLEM 1
Open fracture of elbow, right, sequela

## 2018-09-28 NOTE — PROGRESS NOTE ADULT - SUBJECTIVE AND OBJECTIVE BOX
Pt Name: JO ROBERTS    MRN: [id number]      Patient is an 86 yo female being followed for open R olecranon fracture, POD #3, for right elbow incision and drainage and open reduction internal fixation of R olecranon. Patient was seen and examined this morning. Examination limited secondary to patient's dementia. No significant events reported overnight. Patient  reports some discomfort from sling in place. She was educated about importance of keeping sling on for apporpriate healing/ immobilization. She denies pain or any other orthopedic complaints. Patient denies fever, chills, abdominal pain, calf pain, numbness, or tingling.    PAST MEDICAL & SURGICAL HISTORY:  PAST MEDICAL & SURGICAL HISTORY:  Aortic valve stenosis, etiology of cardiac valve disease unspecified  Depression, unspecified depression type  Essential hypertension  HLD (hyperlipidemia)  Hypertension  H/O hysterectomy for benign disease  No significant past surgical history      Allergies: No Known Allergies      Medications: acetaminophen   Tablet .. 975 milliGRAM(s) Oral every 8 hours  docusate sodium 100 milliGRAM(s) Oral three times a day  enoxaparin Injectable 40 milliGRAM(s) SubCutaneous daily  ferrous    sulfate 325 milliGRAM(s) Oral three times a day with meals  lactated ringers. 1000 milliLiter(s) IV Continuous <Continuous>  magnesium oxide 400 milliGRAM(s) Oral two times a day with meals  metoprolol tartrate 25 milliGRAM(s) Oral two times a day  multivitamin 1 Tablet(s) Oral daily  ondansetron Injectable 4 milliGRAM(s) IV Push every 6 hours PRN  oxyCODONE    IR 2.5 milliGRAM(s) Oral every 3 hours PRN  QUEtiapine 25 milliGRAM(s) Oral at bedtime                              9.1    7.3   )-----------( 203      ( 27 Sep 2018 06:16 )             28.1         09-28    139  |  102  |  16.0  ----------------------------<  88  3.8   |  26.0  |  0.69    Ca    9.0      28 Sep 2018 06:16  Phos  2.4     09-  Mg     1.9     -        PHYSICAL EXAM:    Vital Signs Last 24 Hrs  T(C): 37.1 (28 Sep 2018 06:06), Max: 37.1 (28 Sep 2018 06:06)  T(F): 98.8 (28 Sep 2018 06:06), Max: 98.8 (28 Sep 2018 06:06)  HR: 62 (28 Sep 2018 06:06) (60 - 66)  BP: 152/78 (28 Sep 2018 06:06) (130/80 - 178/67)  BP(mean): --  RR: 16 (28 Sep 2018 06:06) (16 - 18)  SpO2: 95% (28 Sep 2018 06:06) (95% - 98%)  Daily     Daily Weight in k (28 Sep 2018 04:47)    Appearance: Alert to person and place, responds slowly, in no acute distress.    Skin: no rash on visible skin. Skin is clean, dry and intact. No bleeding. No abrasions.    Musculoskeletal:         Right Upper Extremity: Skin warm and intact. Posterior splint in place- clean, dry, and intact. No drainage noted. RIOS dressing in place, draining well.  +wrist flexion/ +wrist extension. SILT distally. Finger flexors/extensors intact.        Left Lower Extremity: calf supple and nontender.        Right Lower Extremity: calf supple and nontender.           A/P: Pt is a 86 yo Female  POD #3 following Right elbow washout and olecranon open reduction internal fixation    PLAN:   -Pain control as clinically indicated  -WBAT lower extremities  -DVT PPX as per primary team, SCDs in place  -Encourage Incentive spirometry  -NWB RUE in posterior slab splint and sling  -Continue rest of care as per primary team Pt Name: JO ROBERTS    MRN: [id number]      Patient is an 88 yo female being followed for open R olecranon fracture, POD #3, for right elbow incision and drainage and open reduction internal fixation of R olecranon. Patient was seen and examined this morning. Examination limited secondary to patient's dementia. No significant events reported overnight. Patient  reports some discomfort from sling in place. She was educated about importance of keeping sling on for apporpriate healing/ immobilization. She denies pain or any other orthopedic complaints. Patient denies fever, chills, abdominal pain, calf pain, numbness, or tingling.    PAST MEDICAL & SURGICAL HISTORY:  PAST MEDICAL & SURGICAL HISTORY:  Aortic valve stenosis, etiology of cardiac valve disease unspecified  Depression, unspecified depression type  Essential hypertension  HLD (hyperlipidemia)  Hypertension  H/O hysterectomy for benign disease  No significant past surgical history      Allergies: No Known Allergies      Medications: acetaminophen   Tablet .. 975 milliGRAM(s) Oral every 8 hours  docusate sodium 100 milliGRAM(s) Oral three times a day  enoxaparin Injectable 40 milliGRAM(s) SubCutaneous daily  ferrous    sulfate 325 milliGRAM(s) Oral three times a day with meals  lactated ringers. 1000 milliLiter(s) IV Continuous <Continuous>  magnesium oxide 400 milliGRAM(s) Oral two times a day with meals  metoprolol tartrate 25 milliGRAM(s) Oral two times a day  multivitamin 1 Tablet(s) Oral daily  ondansetron Injectable 4 milliGRAM(s) IV Push every 6 hours PRN  oxyCODONE    IR 2.5 milliGRAM(s) Oral every 3 hours PRN  QUEtiapine 25 milliGRAM(s) Oral at bedtime                              9.1    7.3   )-----------( 203      ( 27 Sep 2018 06:16 )             28.1         09-28    139  |  102  |  16.0  ----------------------------<  88  3.8   |  26.0  |  0.69    Ca    9.0      28 Sep 2018 06:16  Phos  2.4     09-  Mg     1.9     -        PHYSICAL EXAM:    Vital Signs Last 24 Hrs  T(C): 37.1 (28 Sep 2018 06:06), Max: 37.1 (28 Sep 2018 06:06)  T(F): 98.8 (28 Sep 2018 06:06), Max: 98.8 (28 Sep 2018 06:06)  HR: 62 (28 Sep 2018 06:06) (60 - 66)  BP: 152/78 (28 Sep 2018 06:06) (130/80 - 178/67)  BP(mean): --  RR: 16 (28 Sep 2018 06:06) (16 - 18)  SpO2: 95% (28 Sep 2018 06:06) (95% - 98%)  Daily     Daily Weight in k (28 Sep 2018 04:47)    Appearance: Alert to person and place, responds slowly, in no acute distress.    Skin: no rash on visible skin. Skin is clean, dry and intact. No bleeding. No abrasions.    Musculoskeletal:         Right Upper Extremity: Skin warm and intact. Posterior splint in place- clean, dry, and intact. No drainage noted. RIOS dressing in place, functioning.  +wrist flexion/ +wrist extension. SILT distally. Finger flexors/extensors intact.        Left Lower Extremity: calf supple and nontender.        Right Lower Extremity: calf supple and nontender.           A/P: Pt is a 88 yo Female  POD #3 following Right elbow washout and olecranon open reduction internal fixation    PLAN:   -Pain control as clinically indicated  -DVT PPX as per primary team, SCDs in place  -Encourage Incentive spirometry  -NWB RUE in posterior slab splint and sling  -Continue rest of care as per primary team

## 2018-09-28 NOTE — PROGRESS NOTE ADULT - PROBLEM SELECTOR PLAN 1
-cont tylenol/motrin, 2.5mg of oxycodone for moderate pain only  -cont IVF @75cc/hr  -encourage po intake
-cont tylenol/motrin, 2.5mg of oxycodone for moderate pain only  -encourage po intake
-mechanical fall versus organic issue like UTI?  -send u/a and urine culture, one dose of rocephin  -pain medications prn  -start IVF@125cc/hr  -NO TORADOL, she may have acute renal failure from potent NSAIDS
-mechanical fall versus organic issue like UTI?  -u/a negative  -pain medications prn  -start IVF@125cc/hr  -NO TORADOL, she may have acute renal failure from potent NSAIDS

## 2018-09-28 NOTE — PROGRESS NOTE ADULT - PROBLEM SELECTOR PLAN 3
-started seroquel 25mg qhs at bedtime
-cont seroquel 25mg qhs at bedtime  -mentating better, does have baseline dementia  -spoke to her son Aman, who agrees w/ d/c plans to rehab, on seroquel at bedtime

## 2018-09-28 NOTE — PROGRESS NOTE ADULT - PROVIDER SPECIALTY LIST ADULT
Anesthesia
Hospitalist
Orthopedics
Hospitalist

## 2018-09-28 NOTE — PROGRESS NOTE ADULT - PROBLEM SELECTOR PLAN 2
-no symptoms of bradycardia  -can d/c monitor
-monitor  -if HR is persistently low, can ask cardiology to evaluate for bradycardia if truly present
-monitor  -if HR is persistently low, can ask cardiology to evaluate for bradycardia if truly present
-no symptoms of bradycardia  -can d/c monitor

## 2018-09-28 NOTE — PROGRESS NOTE ADULT - SUBJECTIVE AND OBJECTIVE BOX
is an 86 yo F presenting to the ED after mechanical fall on 9/23/18 resulting in R elbow fx. PMH HTN, HLD, Depression, Dementia, Aortic Stenosis.   She is s/p OR and elbow fixation, and she's mentating better today. We d/cd any doses of narcotics, we're encouraging use of tylenol and motrin. We've hydrated her and she can be d/cd today. She is mentating better today.     Summary:   REVIEW OF SYSTEMS    General:	improving, mentating today, answers simple questions    Skin/Breast:  	  Ophthalmologic:  	  ENMT:	    Respiratory and Thorax:  	  Cardiovascular:	    Gastrointestinal:	    Genitourinary:	    Musculoskeletal:	    Neurological:	    Psychiatric:	    Hematology/Lymphatics:	    Endocrine:	    Allergic/Immunologic:    Vital Signs Last 24 Hrs  T(C): 36.4 (28 Sep 2018 12:06), Max: 37.1 (28 Sep 2018 06:06)  T(F): 97.5 (28 Sep 2018 12:06), Max: 98.8 (28 Sep 2018 06:06)  HR: 62 (28 Sep 2018 12:06) (62 - 66)  BP: 130/62 (28 Sep 2018 12:06) (130/62 - 178/67)  BP(mean): --  RR: 16 (28 Sep 2018 12:06) (16 - 18)  SpO2: 99% (28 Sep 2018 12:06) (95% - 99%)	  GENERAL: frail elderly female, , responds to verbal stimuli, answering simple questions  HEENT: head NC/AT; EOM intact, PERRLA, conjunctiva & sclera clear; moist mucous membranes  NECK: supple, no JVD  RESPIRATORY: CTA B/L, no wheezing, rales, rhonchi or rubs  CARDIOVASCULAR: S1&S2, RRR, +4/6 JAVI RUSB w/ radiation to carotids  ABDOMEN: soft, non-tender, non-distended, + Bowel sounds x4 quadrants, no guarding, rebound or rigidity  MUSCULOSKELETAL:  no clubbing, cyanosis or edema of LE. RUE in splint s/p bandages  LYMPH: no cervical lymphadenopathy  VASCULAR: Radial pulses 2+ bilaterally, no varicose veins   SKIN: warm and dry, color normal  NEUROLOGIC: alert, responsive to verbal and tactile stimuli, MAEx4, CN 2-12 intact, 5/5 LLE/RLE, LUE muslce strength, normal finger  b/l UE  Psych: Normal mood and affect, normal behavior                  9.1    7.3   )-----------( 203      ( 27 Sep 2018 06:16 )             28.1     09-28    139  |  102  |  16.0  ----------------------------<  88  3.8   |  26.0  |  0.69    Ca    9.0      28 Sep 2018 06:16  Phos  2.4     09-28  Mg     1.9     09-28    Radiology:     MEDICATIONS  (STANDING):  acetaminophen   Tablet .. 975 milliGRAM(s) Oral every 8 hours  aspirin enteric coated 81 milliGRAM(s) Oral daily  atorvastatin 20 milliGRAM(s) Oral at bedtime  benzocaine 15 mG/menthol 3.6 mG Lozenge 1 Lozenge Oral every 4 hours  docusate sodium 100 milliGRAM(s) Oral three times a day  enoxaparin Injectable 40 milliGRAM(s) SubCutaneous daily  ferrous    sulfate 325 milliGRAM(s) Oral three times a day with meals  lisinopril 5 milliGRAM(s) Oral daily  magnesium oxide 400 milliGRAM(s) Oral two times a day with meals  metoprolol tartrate 25 milliGRAM(s) Oral two times a day  multivitamin 1 Tablet(s) Oral daily  QUEtiapine 25 milliGRAM(s) Oral at bedtime  sertraline 75 milliGRAM(s) Oral daily    MEDICATIONS  (PRN):  meclizine 12.5 milliGRAM(s) Oral two times a day PRN for dizziness  ondansetron Injectable 4 milliGRAM(s) IV Push every 6 hours PRN Nausea and/or Vomiting  oxyCODONE    IR 2.5 milliGRAM(s) Oral every 3 hours PRN Mild Pain (1 - 3)

## 2018-10-12 ENCOUNTER — APPOINTMENT (OUTPATIENT)
Dept: ORTHOPEDIC SURGERY | Facility: CLINIC | Age: 83
End: 2018-10-12
Payer: MEDICARE

## 2018-10-12 DIAGNOSIS — M25.521 PAIN IN RIGHT ELBOW: ICD-10-CM

## 2018-10-12 PROCEDURE — 73070 X-RAY EXAM OF ELBOW: CPT | Mod: 26,RT

## 2018-10-12 PROCEDURE — 99024 POSTOP FOLLOW-UP VISIT: CPT

## 2018-10-19 PROBLEM — E78.00 PURE HYPERCHOLESTEROLEMIA, UNSPECIFIED: Chronic | Status: ACTIVE | Noted: 2018-01-08

## 2018-10-19 PROBLEM — F32.9 MAJOR DEPRESSIVE DISORDER, SINGLE EPISODE, UNSPECIFIED: Chronic | Status: ACTIVE | Noted: 2018-01-08

## 2018-10-19 RX ORDER — IBUPROFEN 200 MG
1 TABLET ORAL
Qty: 0 | Refills: 0 | COMMUNITY

## 2018-10-19 RX ORDER — ATORVASTATIN CALCIUM 80 MG/1
1 TABLET, FILM COATED ORAL
Qty: 0 | Refills: 0 | COMMUNITY

## 2018-10-19 RX ORDER — SERTRALINE 25 MG/1
0 TABLET, FILM COATED ORAL
Qty: 0 | Refills: 0 | COMMUNITY

## 2018-10-19 RX ORDER — ATENOLOL 25 MG/1
0 TABLET ORAL
Qty: 0 | Refills: 0 | COMMUNITY

## 2018-10-19 RX ORDER — SERTRALINE 25 MG/1
1.5 TABLET, FILM COATED ORAL
Qty: 0 | Refills: 0 | COMMUNITY

## 2018-10-19 RX ORDER — FERROUS SULFATE 325(65) MG
1 TABLET ORAL
Qty: 0 | Refills: 0 | COMMUNITY

## 2018-10-19 RX ORDER — MECLIZINE HCL 12.5 MG
1 TABLET ORAL
Qty: 0 | Refills: 0 | COMMUNITY

## 2018-10-19 RX ORDER — HYDROCHLOROTHIAZIDE 25 MG
0 TABLET ORAL
Qty: 0 | Refills: 0 | COMMUNITY

## 2018-10-19 RX ORDER — BENZOCAINE 10 %
1 GEL (GRAM) MUCOUS MEMBRANE
Qty: 0 | Refills: 0 | COMMUNITY

## 2018-10-19 RX ORDER — ATENOLOL 25 MG/1
1 TABLET ORAL
Qty: 0 | Refills: 0 | COMMUNITY

## 2018-10-19 RX ORDER — ATORVASTATIN CALCIUM 80 MG/1
0 TABLET, FILM COATED ORAL
Qty: 0 | Refills: 0 | COMMUNITY

## 2018-11-07 ENCOUNTER — APPOINTMENT (OUTPATIENT)
Dept: ORTHOPEDIC SURGERY | Facility: CLINIC | Age: 83
End: 2018-11-07
Payer: MEDICARE

## 2018-11-07 VITALS
BODY MASS INDEX: 22.19 KG/M2 | DIASTOLIC BLOOD PRESSURE: 66 MMHG | WEIGHT: 113 LBS | HEIGHT: 60 IN | SYSTOLIC BLOOD PRESSURE: 128 MMHG | HEART RATE: 64 BPM

## 2018-11-07 DIAGNOSIS — Z86.79 PERSONAL HISTORY OF OTHER DISEASES OF THE CIRCULATORY SYSTEM: ICD-10-CM

## 2018-11-07 DIAGNOSIS — S42.401D UNSPECIFIED FRACTURE OF LOWER END OF RIGHT HUMERUS, SUBSEQUENT ENCOUNTER FOR FRACTURE WITH ROUTINE HEALING: ICD-10-CM

## 2018-11-07 PROCEDURE — 99024 POSTOP FOLLOW-UP VISIT: CPT

## 2018-11-07 PROCEDURE — 73080 X-RAY EXAM OF ELBOW: CPT | Mod: RT

## 2019-04-09 ENCOUNTER — TRANSCRIPTION ENCOUNTER (OUTPATIENT)
Age: 84
End: 2019-04-09

## 2019-05-10 NOTE — ED PROVIDER NOTE - NS ED MD TWO NIGHTS YN
Pt received bedside, RA, +IV, awake, alert, in NAD. MOC present. Patient with baseline coughing, no wet/gurgly vocal quality noted prior to PO administration.
Yes

## 2020-10-02 NOTE — PATIENT PROFILE ADULT. - PAIN SCALE PREFERRED, PROFILE
fell yesterday and hit head on a wall, mother wants her checked, denies any obvious injuries
numerical 0-10

## 2021-12-12 NOTE — DISCHARGE NOTE ADULT - PATIENT PORTAL LINK FT
You can access the [facility], offered by [facility], by registering with the following website: [email or web address]

## 2021-12-21 NOTE — ED PROVIDER NOTE - SKIN NEGATIVE STATEMENT, MLM
no abrasions, no jaundice, no lesions, no pruritis, and no rashes. Siliq Counseling:  I discussed with the patient the risks of Siliq including but not limited to new or worsening depression, suicidal thoughts and behavior, immunosuppression, malignancy, posterior leukoencephalopathy syndrome, and serious infections.  The patient understands that monitoring is required including a PPD at baseline and must alert us or the primary physician if symptoms of infection or other concerning signs are noted. There is also a special program designed to monitor depression which is required with Siliq.

## 2022-01-28 NOTE — PATIENT PROFILE ADULT. - AS SC BRADEN NUTRITION
General Question     Subject: PATIENTS SON IS CALLING ABOUT INSURANCE INFORMATION REGARDING PATIENTS UPCOMING [address]. PLEASE ADVISE  Patient: Kip Chavez  Contact Number: [phone]

## 2022-07-27 NOTE — PATIENT PROFILE ADULT. - NSSUBSTANCEUSE_GEN_ALL_CORE_SD
PACU ANESTHESIA ADMISSION NOTE      Procedure: Colonoscopy  Post op diagnosis:  AVM        __x__  Patent Airway    _x___  Full return of protective reflexes    __x__  Full recovery from anesthesia / back to baseline     Vitals:   T: 97.9          R:  12                BP:  118/57                Sat: 98%                  P: 80      Mental Status:  __x__ Awake   _____ Alert   _____ Drowsy   _____ Sedated    Nausea/Vomiting:  ____ NO  ______Yes,   See Post - Op Orders          Pain Scale (0-10):  _____    Treatment: ____ None    ___x_ See Post - Op/PCA Orders    Post - Operative Fluids:   ____ Oral   __x__ See Post - Op Orders    Plan: Discharge:   ____Home       _____Floor     _____Critical Care    _____  Other:_________________    Comments: Pt tolerated procedure well, no anesthesia related complications. Care of pt endorsed to PACU, report given to PACU RN. Discharge when criteria are met. never used

## 2022-12-20 NOTE — BRIEF OPERATIVE NOTE - PRE-OP DX
Olecranon fracture  09/25/2018  right Grade I open olecranon fracture  Active  Elijah Figueredo
right upper arm

## 2023-04-26 NOTE — DISCHARGE NOTE ADULT - NS AS DC AMI YN
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 Per Dr. Nobles, this was discussed with Dr. Stephens and the recommendation is to hold AC for 2 weeks. Patient will follow up in 1 week with neurosurgery and cardiology. no

## 2023-08-15 NOTE — PATIENT PROFILE ADULT. - SUPPORT PERSON PHONE
Airway    Performed by: Benjamin Gonzalez  Authorized by: Jaja Dalton MD    Final Airway Type:  Endotracheal airway  Final Endotracheal Airway*:  NIM tube  ETT Size (mm)*:  7.0  Cuff*:  Regular  Technique Used for Successful ETT Placement:  Direct laryngoscopy  Devices/Methods Used in Placement*:  Mask, Stylet and Oral ETT  Intubation Procedure*:  Preoxygenation, ETCO2, Atraumatic, Dentition Unchanged and Pharynx Clear  Insertion Site:  Oral  Blade Type*:  MAC  Blade Size*:  3  Cuff Volume (mL):  7  Measured from*:  Teeth  Secured at (cm)*:  21  Placement Verified by: auscultation and capnometry    Glottic View*:  1 - full view of glottis  Attempts*:  1   Patient Identified, Procedure confirmed, Emergency equipment available and Safety protocols followed  Location:  OR  Urgency:  Elective  Difficult Airway: No    Indications for Airway Management:  Anesthesia  Spontaneous Ventilation: absent    Sedation Level:  Anesthetized  Mask Difficulty Assessment:  1 - vent by mask  Start Time: 8/15/2023 2:13 PM       [phone]

## 2023-10-21 NOTE — DISCHARGE NOTE ADULT - DEVELOP COPING SKILLS. FOR EXAMPLE, STRATEGIES AND LIFESTYLE CHANGES THAT REDUCE NEGATIVE MOODS, STRESS, AND EXPOSURE TO SMOKING CUES
Pt reports requesting Hcg levels to make sure they are falling. Pt denies any discrete pain, discomfort, or chief complaints. Pt denies any symptoms: denies chest pain, dizziness, weakness, dysuria, discharge, bleeding, abdominal, or pelvic pain. Pt alert, oriented, and ambulatory at time of assessment. Statement Selected

## 2024-01-02 NOTE — STUDENT SIGN OFF DOCUMENT - STUDENT DOCUMENT REVIEW
Joanie Mendieta OTS If you are a smoker, it is important for your health to stop smoking. Please be aware that second hand smoke is also harmful.

## 2024-01-05 NOTE — CONSULT NOTE ADULT - PROBLEM SELECTOR PROBLEM 2
Community Meeting Group Note        Date: January 5, 2024 Start Time: 9am  End Time: 10am      Number of Participants in Group & Unit Census:  10/22    Topic: Goals    Goal of Group:Coping skills      Comments:     Patient did not participate in Community Meeting group, despite staff encouragement and explanation of benefits.  Patient remain seclusive to self.  Q15 minute safety checks maintained for patient safety and will continue to encourage patient to attend unit programming.      R/O Aortic valve stenosis, etiology of cardiac valve disease unspecified

## 2024-07-30 NOTE — ED ADULT NURSE NOTE - NSIMPLEMENTINTERV_GEN_ALL_ED
- Pradaxa was stopped during last admission for bleeding risk (early july 2024). Known melena during that admission. - CHADSVASC 5, HASBLED 3.  - In continuous AFib during admission, with HR ranging from 80s to 130s    Plan:  - cardiology following; appreciate recs  - no AC for now due to recent presumed GI bleed  - give metoprolol tartrate 25 mg q6h for rate control  - hold rhythm control given increased risk of stroke w/ conversion to NSR off AC. Cards does not want to give digoxin or amiodarone at this point Implemented All Universal Safety Interventions:  [place] to call system. Call bell, personal items and telephone within reach. Instruct patient to call for assistance. Room bathroom lighting operational. Non-slip footwear when patient is off stretcher. Physically safe environment: no spills, clutter or unnecessary equipment. Stretcher in lowest position, wheels locked, appropriate side rails in place.

## 2024-10-29 NOTE — ASU PREOP CHECKLIST - PATIENT'S PERSONAL PROPERTY REMOVED
[facility] EMERGENCY DEPARTMENT [place]  [address]  [facility] [address]  Phone: [phone]  Fax: [phone]    Kev Rodriguez was seen and treated in our emergency department on 10/29/2024.  She may return to work on 10/30/2024.         Thank you for choosing [facility].    Phil Rosas MD       blanket in closet

## 2024-11-18 NOTE — STROKE CODE NOTE - ER ARRIVAL: DATE/TIME
Addended by: JEMMA MARTINEZ on: 11/18/2024 10:21 AM     Modules accepted: Orders     08-Jan-2018 14:23